# Patient Record
Sex: FEMALE | Race: WHITE | Employment: OTHER | URBAN - METROPOLITAN AREA
[De-identification: names, ages, dates, MRNs, and addresses within clinical notes are randomized per-mention and may not be internally consistent; named-entity substitution may affect disease eponyms.]

---

## 2017-01-05 PROCEDURE — 87086 URINE CULTURE/COLONY COUNT: CPT | Performed by: INTERNAL MEDICINE

## 2017-01-26 RX ORDER — ZOLPIDEM TARTRATE 12.5 MG/1
TABLET, FILM COATED, EXTENDED RELEASE ORAL
Qty: 30 TABLET | Refills: 3 | Status: SHIPPED
Start: 2017-01-26 | End: 2017-05-19

## 2017-02-01 ENCOUNTER — TELEPHONE (OUTPATIENT)
Dept: INTERNAL MEDICINE CLINIC | Facility: CLINIC | Age: 62
End: 2017-02-01

## 2017-02-01 NOTE — TELEPHONE ENCOUNTER
Incoming (mail or fax): Fax  Received from:  Dr. Tobias Roberts  Documentation given to:  Dr. Judit Hernandes consult folder.

## 2017-02-02 ENCOUNTER — MED REC SCAN ONLY (OUTPATIENT)
Dept: INTERNAL MEDICINE CLINIC | Facility: CLINIC | Age: 62
End: 2017-02-02

## 2017-02-10 ENCOUNTER — APPOINTMENT (OUTPATIENT)
Dept: LAB | Age: 62
End: 2017-02-10
Attending: PHYSICIAN ASSISTANT
Payer: MEDICARE

## 2017-02-10 DIAGNOSIS — D48.5 NEOPLASM OF UNCERTAIN BEHAVIOR OF SKIN: ICD-10-CM

## 2017-02-10 PROCEDURE — 88342 IMHCHEM/IMCYTCHM 1ST ANTB: CPT

## 2017-02-20 ENCOUNTER — TELEPHONE (OUTPATIENT)
Dept: INTERNAL MEDICINE CLINIC | Facility: CLINIC | Age: 62
End: 2017-02-20

## 2017-02-20 NOTE — TELEPHONE ENCOUNTER
i can't think of anything else  She cannot have anything stronger than dilaudid for pain, or clonaz and ambien for sleep

## 2017-02-20 NOTE — TELEPHONE ENCOUNTER
See pt faxed letter request.  Celeste Ribeiro to [facility]. Asking for medication for sleeping and pain control on flight. Please advise.

## 2017-02-20 NOTE — TELEPHONE ENCOUNTER
Spoke with pt, advised she already has [place] and clonazepam, she stated she wasn't sure that was going to be enough. Advised she should not take additional sleeping medications.  Noted she has dilaudid for pain but pt stated it \"wires\" her so requesting

## 2017-03-17 ENCOUNTER — TELEPHONE (OUTPATIENT)
Dept: INTERNAL MEDICINE CLINIC | Facility: CLINIC | Age: 62
End: 2017-03-17

## 2017-03-17 ENCOUNTER — OFFICE VISIT (OUTPATIENT)
Dept: INTERNAL MEDICINE CLINIC | Facility: CLINIC | Age: 62
End: 2017-03-17

## 2017-03-17 VITALS
SYSTOLIC BLOOD PRESSURE: 90 MMHG | RESPIRATION RATE: 12 BRPM | HEART RATE: 85 BPM | BODY MASS INDEX: 17.61 KG/M2 | TEMPERATURE: 99 F | HEIGHT: 72 IN | OXYGEN SATURATION: 98 % | DIASTOLIC BLOOD PRESSURE: 50 MMHG | WEIGHT: 130 LBS

## 2017-03-17 DIAGNOSIS — S82.892G CLOSED LEFT ANKLE FRACTURE, WITH DELAYED HEALING, SUBSEQUENT ENCOUNTER: Primary | ICD-10-CM

## 2017-03-17 DIAGNOSIS — Z01.818 PREOP EXAMINATION: ICD-10-CM

## 2017-03-17 DIAGNOSIS — G50.0 TRIGEMINAL NEURALGIA: ICD-10-CM

## 2017-03-17 PROCEDURE — 93000 ELECTROCARDIOGRAM COMPLETE: CPT | Performed by: PHYSICIAN ASSISTANT

## 2017-03-17 PROCEDURE — 99214 OFFICE O/P EST MOD 30 MIN: CPT | Performed by: PHYSICIAN ASSISTANT

## 2017-03-17 NOTE — TELEPHONE ENCOUNTER
Date Preop Scheduled:   Today, 3/17/17  Surgeon's Name:  Dr. Teto Ambrosio   Phone #:  [phone]   Fax #:  Unknown  Surgery Date:  Tuesday, 3/21/17  Procedure/Diagnosis:  Plate being placed in left ankle due to break    Patient states she needs an EKG and visit

## 2017-03-17 NOTE — TELEPHONE ENCOUNTER
Does not appear paperwork was received. Spoke with Jazmin Wiley at [employer] advising would be faxing request and need asap. Fax [phone].

## 2017-03-17 NOTE — PROGRESS NOTES
Alfredo Broderick is a 62year old female who presents for a pre-operative physical exam.   Alfredo Broderick is scheduled for a left ankle open reduction  Internal fixation lateral mallelous procedure at [facility] on 03/21/17 pe 2009     hx: Lorencinda Guerrero, wt loss\"   • Rhabdomyolysis 3/2008   • Bursitis      iliotibial    • Atypical face pain    • Sinus tachycardia    • Cecal volvulus (HCC)    • Thrombocytopenia (HCC)    • Neutrophilic leukocytosis    • H/O respiratory failure    • of TMJ disorder    • Menometrorrhagia    • Neck pain    • Vaginal lump    • Stress fracture      metatarsal, L foot   • History of renal stone    • Pes planus      and hyperpronation   • Myofascial pain    • Bronchitis    • URI (upper respiratory infection pain, early satiety): Normal    OTHER SURGICAL HISTORY  6-28-10    Comment flow US ,dr Montague Height HISTORY  7-8-10    Comment Cysto- Dr. Montague Height HISTORY  8/3/05  [facility])    Comment EGD: Duodenitis, gastritis; duodenal biop (4 mg total) by mouth daily as needed for Pain.  Disp: 30 tablet Rfl: 0   GABAPENTIN 600 MG Oral Tab TAKE 1 & 1/2 TABLETS BY MOUTH 4 TIMES A DAY Disp: 180 tablet Rfl: 3   KLOR-CON M20 20 MEQ Oral Tab CR TAKE 2 TABLETS BY MOUTH EVERY DAY Disp: 60 tablet Rfl: LVH    ASSESSMENT AND PLAN:   Huong Green has no significant history of cardiac or pulmonary conditions. She is a good surgical candidate. This consult was sent back the referring physician, Dr. Lavon Canavan.     Assessment:  Closed left ankle fracture, w

## 2017-03-17 NOTE — TELEPHONE ENCOUNTER
Incoming (mail or fax): Fax  Received from:  [facility] [place] office  Documentation given to:  Triage-incoming pre-op bin.

## 2017-03-18 ENCOUNTER — PATIENT MESSAGE (OUTPATIENT)
Dept: INTERNAL MEDICINE CLINIC | Facility: CLINIC | Age: 62
End: 2017-03-18

## 2017-03-20 NOTE — TELEPHONE ENCOUNTER
Reviewed problem list and meds; don't see any reason to avoid meds; podiatry will give appropriate meds to manage her pain and she can update us when she finds out which medication that is.

## 2017-03-20 NOTE — TELEPHONE ENCOUNTER
Noted below, but no new medication has been prescribed. Does her medical issues give her any cause to avoid certain pain medications? I am not sure why she is asking but will advise her to update us on the medication change when she knows.

## 2017-03-20 NOTE — TELEPHONE ENCOUNTER
From: Margaux Bush  To: Isac Moy MD  Sent: 3/18/2017 8:54 PM CDT  Subject: Prescription Question    I am having ankle surgery on Tues. & I hope it is OK if I accept the pain killer the  offers because my hydroxycodone wires  me & I c

## 2017-04-03 PROCEDURE — 87077 CULTURE AEROBIC IDENTIFY: CPT | Performed by: UROLOGY

## 2017-04-03 PROCEDURE — 87186 SC STD MICRODIL/AGAR DIL: CPT | Performed by: UROLOGY

## 2017-04-03 PROCEDURE — 87086 URINE CULTURE/COLONY COUNT: CPT | Performed by: UROLOGY

## 2017-04-24 RX ORDER — POTASSIUM CHLORIDE 20 MEQ/1
TABLET, EXTENDED RELEASE ORAL
Qty: 60 TABLET | Refills: 0 | Status: SHIPPED | OUTPATIENT
Start: 2017-04-24 | End: 2017-05-19

## 2017-04-24 NOTE — TELEPHONE ENCOUNTER
Needs K+  I see taht her neurologist ordered a CMP? Is that for now or in year?  i would think now as there hasn't been a recent one

## 2017-04-24 NOTE — TELEPHONE ENCOUNTER
Last OV pertinent to medication: 3/17/17 for Pre-Op, 9/2/16 for physical  Last refill date: 8/29/16     #/refills: #60 + 6  When pt was asked to return for OV: NA  Upcoming appt/reason: No future appt      Lab Results  Component Value Date   K 3.8 05/21/20

## 2017-04-26 ENCOUNTER — TELEPHONE (OUTPATIENT)
Dept: INTERNAL MEDICINE CLINIC | Facility: CLINIC | Age: 62
End: 2017-04-26

## 2017-04-26 NOTE — TELEPHONE ENCOUNTER
Received fax from [facility] re: labs done 11/15/16. Included TSH, Magnesium, BMP, Vitamin D---results normal;  and Tissue transglutaminase antibody IGG. To consult folder.

## 2017-04-26 NOTE — TELEPHONE ENCOUNTER
Incoming (mail or fax): Fax  Received from:  [facility] office  Documentation given to:  Triage- in Dr. Nancy Simmons test results.

## 2017-04-27 NOTE — TELEPHONE ENCOUNTER
Labs are normal except vitamin D is a little low at 23. Normal is between . Can you call and ask pt if she is taking supplement right now?  I would like her to start taking 57073 IU weekly x 12 weeks and then recheck vitamin D level so let me know

## 2017-05-01 NOTE — TELEPHONE ENCOUNTER
Its only minimally lower than normal so she can continue with 5000 IU of vitamin D2 if she can't tolerate vitamin D3; summer/sun exposure will also help.  Please update HM

## 2017-05-01 NOTE — TELEPHONE ENCOUNTER
Spoke with pt. Advised as below that vitamin D level is low. Pt states that she is currently taking Vitamin D2  5,000 u daily, sourced from mushrooms. States that she has tried vitamin D3 supplements before and they cause constipation.  States that she hea

## 2017-05-22 RX ORDER — POTASSIUM CHLORIDE 20 MEQ/1
TABLET, EXTENDED RELEASE ORAL
Qty: 60 TABLET | Refills: 1 | Status: SHIPPED | OUTPATIENT
Start: 2017-05-22 | End: 2017-08-29

## 2017-05-22 RX ORDER — ZOLPIDEM TARTRATE 12.5 MG/1
TABLET, FILM COATED, EXTENDED RELEASE ORAL
Qty: 30 TABLET | Refills: 1 | Status: SHIPPED
Start: 2017-05-22 | End: 2017-09-08

## 2017-05-22 NOTE — TELEPHONE ENCOUNTER
Last OV pertinent to medication: 3/17/17 for Pre-Op, 9/2/16 for physical  Last refill date: 4/24/17     #/refills: #60 + 0  Zolpidem 1/26/17 #30 + 3  When pt was asked to return for OV: NA  Upcoming appt/reason: No future appt      Lab Results  Component V

## 2017-05-26 PROBLEM — R39.198 DIFFICULTY URINATING: Status: ACTIVE | Noted: 2017-05-26

## 2017-05-26 PROCEDURE — 87186 SC STD MICRODIL/AGAR DIL: CPT | Performed by: FAMILY MEDICINE

## 2017-05-26 PROCEDURE — 87077 CULTURE AEROBIC IDENTIFY: CPT | Performed by: FAMILY MEDICINE

## 2017-05-26 PROCEDURE — 87086 URINE CULTURE/COLONY COUNT: CPT | Performed by: FAMILY MEDICINE

## 2017-06-23 PROBLEM — R39.11 HESITANCY: Status: ACTIVE | Noted: 2017-06-23

## 2017-06-29 PROCEDURE — 87086 URINE CULTURE/COLONY COUNT: CPT | Performed by: PHYSICIAN ASSISTANT

## 2017-06-29 PROCEDURE — 87077 CULTURE AEROBIC IDENTIFY: CPT | Performed by: PHYSICIAN ASSISTANT

## 2017-06-29 PROCEDURE — 87186 SC STD MICRODIL/AGAR DIL: CPT | Performed by: PHYSICIAN ASSISTANT

## 2017-07-19 ENCOUNTER — APPOINTMENT (OUTPATIENT)
Dept: PHYSICAL THERAPY | Facility: HOSPITAL | Age: 62
End: 2017-07-19
Attending: UROLOGY
Payer: MEDICARE

## 2017-07-20 ENCOUNTER — APPOINTMENT (OUTPATIENT)
Dept: PHYSICAL THERAPY | Facility: HOSPITAL | Age: 62
End: 2017-07-20
Attending: UROLOGY
Payer: MEDICARE

## 2017-07-26 ENCOUNTER — APPOINTMENT (OUTPATIENT)
Dept: PHYSICAL THERAPY | Facility: HOSPITAL | Age: 62
End: 2017-07-26
Attending: UROLOGY
Payer: MEDICARE

## 2017-08-02 ENCOUNTER — APPOINTMENT (OUTPATIENT)
Dept: PHYSICAL THERAPY | Facility: HOSPITAL | Age: 62
End: 2017-08-02
Attending: UROLOGY
Payer: MEDICARE

## 2017-08-09 ENCOUNTER — APPOINTMENT (OUTPATIENT)
Dept: PHYSICAL THERAPY | Facility: HOSPITAL | Age: 62
End: 2017-08-09
Attending: UROLOGY
Payer: MEDICARE

## 2017-08-09 ENCOUNTER — PATIENT MESSAGE (OUTPATIENT)
Dept: INTERNAL MEDICINE CLINIC | Facility: CLINIC | Age: 62
End: 2017-08-09

## 2017-08-10 NOTE — TELEPHONE ENCOUNTER
From: Belén Quinones  To: Vero Hall MD  Sent: 8/9/2017 10:37 PM CDT  Subject: Non-Urgent Medical Question    I would like you to have a copy of my recent visit to [place] . .dates? ?? I think somewhere between 5/1/17 & 6/30/17.  Can you get the

## 2017-08-16 ENCOUNTER — APPOINTMENT (OUTPATIENT)
Dept: PHYSICAL THERAPY | Facility: HOSPITAL | Age: 62
End: 2017-08-16
Attending: UROLOGY
Payer: MEDICARE

## 2017-08-24 ENCOUNTER — PATIENT MESSAGE (OUTPATIENT)
Dept: INTERNAL MEDICINE CLINIC | Facility: CLINIC | Age: 62
End: 2017-08-24

## 2017-08-25 NOTE — TELEPHONE ENCOUNTER
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   This is written in my MRI of 8/21/17 for inner

## 2017-08-30 RX ORDER — POTASSIUM CHLORIDE 20 MEQ/1
TABLET, EXTENDED RELEASE ORAL
Qty: 60 TABLET | Refills: 0 | Status: SHIPPED | OUTPATIENT
Start: 2017-08-30 | End: 2017-10-11

## 2017-08-30 NOTE — TELEPHONE ENCOUNTER
Last OV pertinent to medication: 9/2/16 cpx  Last refill date: 5/22/17     #/refills: 60/1  When pt was asked to return for OV: 1 yr  Upcoming appt/reason: 10/6/17 breast/pelvic    Lab Results  Component Value Date    (H) 05/08/2017   BUN 17 05/08/2

## 2017-09-08 NOTE — TELEPHONE ENCOUNTER
From: Irena Reyna  Sent: 9/8/2017 12:15 PM CDT  Subject: Medication Renewal Request    Martha Go would like a refill of the following medications:  ZOLPIDEM TARTRATE ER 12.5 MG Oral Tab MARY JANE Contreras MD]    [facility]

## 2017-09-11 RX ORDER — ZOLPIDEM TARTRATE 12.5 MG/1
12.5 TABLET, FILM COATED, EXTENDED RELEASE ORAL NIGHTLY PRN
Qty: 30 TABLET | Refills: 1 | Status: SHIPPED
Start: 2017-09-11 | End: 2017-10-11

## 2017-09-11 NOTE — TELEPHONE ENCOUNTER
Last OV pertinent to medication: 3/17/17  Last refill date: 5/22/17     #/refills: 30/1  When pt was asked to return for OV: for breast/pelvic  Upcoming appt/reason: 10/6/17 breast and pelvic

## 2017-09-23 DIAGNOSIS — E87.6 LOW BLOOD POTASSIUM: Primary | ICD-10-CM

## 2017-09-25 RX ORDER — POTASSIUM CHLORIDE 20 MEQ/1
TABLET, EXTENDED RELEASE ORAL
Qty: 60 TABLET | Refills: 0 | Status: SHIPPED | OUTPATIENT
Start: 2017-09-25 | End: 2017-10-11

## 2017-09-25 NOTE — TELEPHONE ENCOUNTER
Last OV pertinent to medication: 9/2/2016 - PE  Last refill date: 8/30/2017     #/refills: 60/0  When pt was asked to return for OV: 1 year  Upcoming appt/reason: 10/6/2017 - Medicare Breast & Pelvic    Lab Results  Component Value Date   K 4.1 05/08/2017

## 2017-09-29 ENCOUNTER — PATIENT MESSAGE (OUTPATIENT)
Dept: INTERNAL MEDICINE CLINIC | Facility: CLINIC | Age: 62
End: 2017-09-29

## 2017-09-30 ENCOUNTER — PATIENT MESSAGE (OUTPATIENT)
Dept: INTERNAL MEDICINE CLINIC | Facility: CLINIC | Age: 62
End: 2017-09-30

## 2017-09-30 DIAGNOSIS — R73.09 ELEVATED GLUCOSE: Primary | ICD-10-CM

## 2017-09-30 NOTE — TELEPHONE ENCOUNTER
From: Jeremy Reina  To: Leidy Zhang MD  Sent: 9/29/2017 9:51 PM CDT  Subject: Non-Urgent Medical Question    Medicare only covers PAP's every 2 years. I had my last one about a year ago. Should I wait about 6 mo. for my next one?   My las

## 2017-10-02 NOTE — TELEPHONE ENCOUNTER
From: Adams Boss  To: Shanice Freed MD  Sent: 9/30/2017 2:24 PM CDT  Subject: Non-Urgent Medical Question    But last year when I had my pap I wasn't on Medicare, so feasibly I could have it in 6 mo. to make it every 1 1/2 years this keaton

## 2017-10-02 NOTE — TELEPHONE ENCOUNTER
From: Ramy Ward  To: Jacqui Higgins MD  Sent: 9/30/2017 2:25 PM CDT  Subject: Test Results Question    I was not fasting. I had just eaten which is why glucose is high . Any comments on abnormal values?

## 2017-10-04 NOTE — TELEPHONE ENCOUNTER
[facility]. Please mail to pt and please advise pt when this is done.  Thank you

## 2017-10-10 ENCOUNTER — TELEPHONE (OUTPATIENT)
Dept: INTERNAL MEDICINE CLINIC | Facility: CLINIC | Age: 62
End: 2017-10-10

## 2017-10-10 NOTE — TELEPHONE ENCOUNTER
Patient called, she has had the low grade fever since Sunday, patient said her normal temperature is 97.3, and it is ranging from about 98.1-98.9, patient is tired, fatigue and dizzy at times.  Patient wanted to antibiotic over the phone, advised to patient

## 2017-10-10 NOTE — TELEPHONE ENCOUNTER
Spoke to pt, advised that a fever and fatigue needs to be seen in the office. Denies acute distress at the moment. OV made with Shelley Swift for 10/11/17.

## 2017-10-11 ENCOUNTER — OFFICE VISIT (OUTPATIENT)
Dept: INTERNAL MEDICINE CLINIC | Facility: CLINIC | Age: 62
End: 2017-10-11

## 2017-10-11 VITALS
WEIGHT: 122 LBS | BODY MASS INDEX: 16.52 KG/M2 | RESPIRATION RATE: 16 BRPM | TEMPERATURE: 98 F | HEIGHT: 72 IN | OXYGEN SATURATION: 98 % | SYSTOLIC BLOOD PRESSURE: 104 MMHG | HEART RATE: 61 BPM | DIASTOLIC BLOOD PRESSURE: 58 MMHG

## 2017-10-11 DIAGNOSIS — J06.9 UPPER RESPIRATORY TRACT INFECTION, UNSPECIFIED TYPE: Primary | ICD-10-CM

## 2017-10-11 PROCEDURE — 99213 OFFICE O/P EST LOW 20 MIN: CPT | Performed by: NURSE PRACTITIONER

## 2017-10-11 RX ORDER — AZITHROMYCIN 250 MG/1
TABLET, FILM COATED ORAL
Qty: 6 TABLET | Refills: 0 | Status: SHIPPED | OUTPATIENT
Start: 2017-10-11 | End: 2017-11-20 | Stop reason: ALTCHOICE

## 2017-10-11 NOTE — PROGRESS NOTES
Patient presents with:  Headache  Hot Flashes  Fatigue  Dizziness      HPI:  Presents with approx 4 day history of fatigue, occasional dizziness, \"hot flashes\", chills, head pressure and headaches.  Also stated she has had \"low grade fevers\" as her temp respiratory failure    • H/O sinus tachycardia    • Hematuria    • High cholesterol    • History of bone density study 8/4/2011   • History of depression    • History of insomnia    • History of oral surgery     7 root canals   • History of renal stone neuralgia    • Unspecified hypothyroidism    • URI (upper respiratory infection)    • URI (upper respiratory infection)    • Vaginal lump    • Vegetarian    • Viral syndrome    • Vitamin D deficiency 7/9/2015    On IM vitamin D q 6 mos w/endocrine   • Volv FLUTICASONE PROPIONATE 50 MCG/ACT Nasal Suspension USE 2 SPRAYS IN EACH NOSTRIL DAILY Disp: 1 Bottle Rfl: 11   ValACYclovir HCl (VALTREX) 1 G Oral Tab Take 2 tabs po at onset repeat 12 hours later Disp: 28 tablet Rfl: 0   Saline 0.2 % Nasal Solution by [facility] & Consults:  None    No Follow-up on file. Patient Instructions                     Gargle with warm salt water solution 3-5 times daily. Dissolve 1/2 teaspoon salt in half cup of warm tap water. Gargle and spit.      Use a humidifier in yo

## 2017-10-11 NOTE — PATIENT INSTRUCTIONS
Gargle with warm salt water solution 3-5 times daily. Dissolve 1/2 teaspoon salt in half cup of warm tap water. Gargle and spit. Use a humidifier in your room at night.      I highly recommend using a saline nasal wash device such as:

## 2017-10-31 RX ORDER — POTASSIUM CHLORIDE 20 MEQ/1
TABLET, EXTENDED RELEASE ORAL
Qty: 60 TABLET | Refills: 0 | OUTPATIENT
Start: 2017-10-31

## 2017-11-02 ENCOUNTER — PATIENT MESSAGE (OUTPATIENT)
Dept: INTERNAL MEDICINE CLINIC | Facility: CLINIC | Age: 62
End: 2017-11-02

## 2017-11-03 NOTE — TELEPHONE ENCOUNTER
From: Vanessa Samuel  To: Collette Swain MD  Sent: 11/2/2017 9:19 PM CDT  Subject: Test Results Question    I am going Friday

## 2017-11-08 NOTE — TELEPHONE ENCOUNTER
Spoke with pt. States that she has been taking Potassium Chloride ER 20 mEq, 1 BID for over 2 years. States that she has chronic diarrhea and needs this supplement. Advised pt that this med does not appear on her current med list and appears to have been discontinued   Pt states that she never stopped taking this med and was taking this med at the time of her last Ov on 10/11/17. Pt asking for a refill but still has #15 pills left. Please advise. OK to refill? Reorder?

## 2017-11-09 RX ORDER — POTASSIUM CHLORIDE 20 MEQ/1
TABLET, EXTENDED RELEASE ORAL
Qty: 60 TABLET | Refills: 2 | Status: SHIPPED | OUTPATIENT
Start: 2017-11-09 | End: 2018-01-25

## 2017-11-16 ENCOUNTER — PATIENT MESSAGE (OUTPATIENT)
Dept: INTERNAL MEDICINE CLINIC | Facility: CLINIC | Age: 62
End: 2017-11-16

## 2017-11-17 NOTE — TELEPHONE ENCOUNTER
See email, pt needs a PT referral because the PT order for TMJ isn't covered? Will you write? Does she need OV?

## 2017-11-17 NOTE — TELEPHONE ENCOUNTER
Who is writing PT orders for the neck issues? For the cervicalgia, trigeminal neuropathy, upper back pain now?

## 2017-11-17 NOTE — TELEPHONE ENCOUNTER
From: Belén Quinones  To: Vero Hall MD  Sent: 11/16/2017 3:42 PM CST  Subject: Non-Urgent Medical Question    Dr Payal Odonnell ordered PT for my moderate-severe TMJ osteoarthritis found on brain scan.  Now we find out that Medicare doesn't cover

## 2017-11-17 NOTE — TELEPHONE ENCOUNTER
Who said she needed PT for \"cervicalgia, C2 rotation, upper back pain, trigeminal neuralgia and poor posture? ?   I would think her neuro would have evaluated all of those things and could recommend PT

## 2017-12-04 ENCOUNTER — TELEPHONE (OUTPATIENT)
Dept: INTERNAL MEDICINE CLINIC | Facility: CLINIC | Age: 62
End: 2017-12-04

## 2017-12-04 NOTE — TELEPHONE ENCOUNTER
Preop Appt. Date:  12/15/17  Surgeon's Name:  Dr. Tamara Tilley       Phone #:  [phone]       Fax #:  [phone]  Surgery being performed/diagnosis:  Removal of metal plate and screws from left ankle  Surgery Date:  Either 12/21/17 or 12/28/17.   Pt will ca

## 2017-12-07 ENCOUNTER — TELEPHONE (OUTPATIENT)
Dept: INTERNAL MEDICINE CLINIC | Facility: CLINIC | Age: 62
End: 2017-12-07

## 2017-12-07 NOTE — TELEPHONE ENCOUNTER
Incoming (mail or fax): fax  Received from:  [address] [facility] [address]  Documentation given to:  triage;

## 2017-12-07 NOTE — TELEPHONE ENCOUNTER
Received fax from [address] [place] [address] ( Dr. Tracey Lock. Tristan) re: notification that pt has surgery 12/21/17 and requesting that a history and physical be completed and faxed to Fax # ([phone]).     Pt has Pre-op appt with Dr Aminah Murdock scheduled 12/15/17

## 2017-12-07 NOTE — TELEPHONE ENCOUNTER
Received fax from [address] [place] [address] per Dr. Nav Scales. Tristan re: letter stating that completed history and physical need to be faxed to Fax # [phone]. See encounter 12/7/17.     To Triage pre-op pending bin

## 2017-12-11 NOTE — TELEPHONE ENCOUNTER
Received completed pre-op requirements form from Dr Jeronimo Castellanos office. Pt will be sent for pre-op testing by surgeon or preadmission (EKG). Pt has pre-op appt with Dr Belinda Vaca on 12/15/17. Routed to Dr Belinda Vaca for review.

## 2017-12-15 ENCOUNTER — OFFICE VISIT (OUTPATIENT)
Dept: INTERNAL MEDICINE CLINIC | Facility: CLINIC | Age: 62
End: 2017-12-15

## 2017-12-15 VITALS
SYSTOLIC BLOOD PRESSURE: 104 MMHG | TEMPERATURE: 99 F | HEART RATE: 63 BPM | BODY MASS INDEX: 18.48 KG/M2 | RESPIRATION RATE: 16 BRPM | OXYGEN SATURATION: 98 % | HEIGHT: 70.75 IN | WEIGHT: 132 LBS | DIASTOLIC BLOOD PRESSURE: 62 MMHG

## 2017-12-15 DIAGNOSIS — K59.9 COLONIC DYSMOTILITY: ICD-10-CM

## 2017-12-15 DIAGNOSIS — E03.9 ACQUIRED HYPOTHYROIDISM: ICD-10-CM

## 2017-12-15 DIAGNOSIS — G50.0 TRIGEMINAL NEURALGIA: ICD-10-CM

## 2017-12-15 DIAGNOSIS — M79.7 FIBROMYALGIA: ICD-10-CM

## 2017-12-15 DIAGNOSIS — F51.01 PRIMARY INSOMNIA: ICD-10-CM

## 2017-12-15 DIAGNOSIS — Z01.818 PREOPERATIVE CLEARANCE: Primary | ICD-10-CM

## 2017-12-15 DIAGNOSIS — T84.84XA PAINFUL ORTHOPAEDIC HARDWARE (HCC): ICD-10-CM

## 2017-12-15 DIAGNOSIS — F17.200 TOBACCO DEPENDENCE: ICD-10-CM

## 2017-12-15 PROBLEM — R39.198 DIFFICULTY URINATING: Status: RESOLVED | Noted: 2017-05-26 | Resolved: 2017-12-15

## 2017-12-15 PROBLEM — R39.11 HESITANCY: Status: RESOLVED | Noted: 2017-06-23 | Resolved: 2017-12-15

## 2017-12-15 PROCEDURE — 99214 OFFICE O/P EST MOD 30 MIN: CPT | Performed by: INTERNAL MEDICINE

## 2017-12-15 RX ORDER — ZOLPIDEM TARTRATE 12.5 MG/1
1 TABLET, FILM COATED, EXTENDED RELEASE ORAL NIGHTLY
COMMUNITY
Start: 2017-11-27 | End: 2017-12-19

## 2017-12-15 NOTE — PROGRESS NOTES
Louis Yuen is a 65year old female who presents for a pre-operative physical exam. Patient is to have removal of hardware left ankle, to be done by Dr. America Mi  at [facility]  on 12/21/17.       HPI:   She is under treatment for chroni Nasal Solution by Nasal route. Disp:  Rfl:    Glucosamine-Chondroit-Vit C-Mn (GLUCOSAMINE CHONDR 1500 COMPLX) Oral Cap Take 1 tablet by mouth daily.  Disp:  Rfl:    Levothyroxine Sodium 75 MCG Oral Tab 1 TABLET DAILY Disp:  Rfl:    DAILY MULTIVITAMIN OR 1 p of bone density study 8/4/2011   • History of depression    • History of insomnia    • History of oral surgery     7 root canals   • History of renal stone    • History of TMJ disorder    • History of UTI    • HPV in female 1990s, then 2014, 16    ASCUS 21 respiratory infection)    • Vaginal lump    • Vegetarian    • Viral syndrome    • Vitamin D deficiency 7/9/2015    On IM vitamin D q 6 mos w/endocrine   • Volvulus (HCC)       Past Surgical History:  No date: BREAST SURGERY PROCEDURE UNLISTED      Comment: SURGERY      Comment: BCC-nodular to left upper lip/MOHS by AB  No date: SPECIAL SERVICE OR REPORT      Comment: Partial thyroidectomy  No date: SPECIAL SERVICE OR REPORT      Comment: Breast augmentation  No date: SPECIAL SERVICE OR REPORT      Comment: S stream  MUSCULOSKELETAL: denies back pain  NEURO: denies headaches  PSYCHE: denies depression or anxiety  HEMATOLOGIC: denies hx of anemia  ENDOCRINE: denies thyroid history  ALL/ASTHMA: denies hx of allergy or asthma      PRE-OP ROS:   Denies hx of VTE, b consult was sent back the referring physician, Danay Nunez.  She underwent ORIF same ankle 3/17 without perioperative complications    Preoperative clearance  (primary encounter diagnosis)  Painful orthopaedic hardware (hcc)  Fibromyalgia- I have informed p

## 2017-12-16 ENCOUNTER — TELEPHONE (OUTPATIENT)
Dept: INTERNAL MEDICINE CLINIC | Facility: CLINIC | Age: 62
End: 2017-12-16

## 2017-12-18 RX ORDER — OXYCODONE HYDROCHLORIDE 5 MG/1
TABLET ORAL
Qty: 12 TABLET | Refills: 0 | COMMUNITY
Start: 2017-12-18 | End: 2018-10-11

## 2017-12-18 NOTE — TELEPHONE ENCOUNTER
Spoke with Ulises Bro, pharmacist at [facility]. States that prescription is ready for pt to  and they will contact pt.

## 2017-12-18 NOTE — TELEPHONE ENCOUNTER
Patient is requesting rx for #30. The copay is the same for $30. She takes 2-3 per week and has surgery on Wednesday so will need more then 12. Please advise patient if we can do this.

## 2017-12-18 NOTE — TELEPHONE ENCOUNTER
Received call from Jeremiah Gibson, pharmacist at [facility]. Requesting clarification of Oxycodone order. Verified dose as below, quantity #12. Prescription pended.

## 2017-12-20 NOTE — TELEPHONE ENCOUNTER
Last OV relevant to medication: 12/15/17  Last refill date: 9/11/17     #/refills: 30/1  When pt was asked to return for OV: none noted  Upcoming appt/reason: none

## 2017-12-21 ENCOUNTER — PATIENT MESSAGE (OUTPATIENT)
Dept: INTERNAL MEDICINE CLINIC | Facility: CLINIC | Age: 62
End: 2017-12-21

## 2017-12-21 RX ORDER — ZOLPIDEM TARTRATE 12.5 MG/1
TABLET, FILM COATED, EXTENDED RELEASE ORAL
Qty: 30 TABLET | Refills: 0 | Status: SHIPPED
Start: 2017-12-21 | End: 2018-03-14

## 2017-12-22 NOTE — TELEPHONE ENCOUNTER
From: Vanesa Kate  To: Ariel Vu MD  Sent: 12/21/2017 9:11 PM CST  Subject: Prescription Question    How about acetaminophen/codeine tabs. They are only $2/mo. Is this something you would prescribe for me?

## 2018-01-19 ENCOUNTER — TELEPHONE (OUTPATIENT)
Dept: INTERNAL MEDICINE CLINIC | Facility: CLINIC | Age: 63
End: 2018-01-19

## 2018-01-19 NOTE — TELEPHONE ENCOUNTER
Milly requests Subscriber Response Form be completed and faxed back to [facility] at fax# [phone].

## 2018-01-24 ENCOUNTER — TELEPHONE (OUTPATIENT)
Dept: INTERNAL MEDICINE CLINIC | Facility: CLINIC | Age: 63
End: 2018-01-24

## 2018-01-25 RX ORDER — POTASSIUM CHLORIDE 1500 MG/1
TABLET, FILM COATED, EXTENDED RELEASE ORAL
Qty: 60 TABLET | Refills: 0 | OUTPATIENT
Start: 2018-01-25

## 2018-01-25 RX ORDER — POTASSIUM CHLORIDE 20 MEQ/1
40 TABLET, EXTENDED RELEASE ORAL
Qty: 60 TABLET | Refills: 2 | Status: SHIPPED | OUTPATIENT
Start: 2018-01-25 | End: 2018-04-10

## 2018-01-25 NOTE — TELEPHONE ENCOUNTER
Last OV relevant to medication: 1215/17  Last refill date: 11/9/17     #/refills: 60/2  When pt was asked to return for OV: none  Upcoming appt/reason: none    Lab Results  Component Value Date   GLU 95 11/03/2017   BUN 16 09/29/2017   CREATSERUM 1.15 (H)

## 2018-01-25 NOTE — TELEPHONE ENCOUNTER
Left detailed message on pt's voice mail re: need condition update. Pt asked to call back regarding her pain.

## 2018-01-30 NOTE — TELEPHONE ENCOUNTER
Left msg for pt to call back to see if she still needs request or if this has been handled.  On chart review, notes sent to neuro, pt already has appt with pain clinic so likely already has referral.

## 2018-01-30 NOTE — TELEPHONE ENCOUNTER
Pt called back, has been treated for pain with opiods in past but no longer receiving from this office, had been recommended by us to go to pain clinic. Pt will be seeing Dr Stefania Santos at [facility] [place] to place referral? # visits? Diagnosis?  (fibro?)

## 2018-01-31 ENCOUNTER — TELEPHONE (OUTPATIENT)
Dept: INTERNAL MEDICINE CLINIC | Facility: CLINIC | Age: 63
End: 2018-01-31

## 2018-01-31 NOTE — TELEPHONE ENCOUNTER
Pt states that [facility] does not have approval for Potassium Chloride ER 20 MEQ Oral Tab CR, please call pt to let her know the status. Thank you.

## 2018-02-01 ENCOUNTER — TELEPHONE (OUTPATIENT)
Dept: INTERNAL MEDICINE CLINIC | Facility: CLINIC | Age: 63
End: 2018-02-01

## 2018-02-01 DIAGNOSIS — M25.572 LEFT ANKLE PAIN, UNSPECIFIED CHRONICITY: Primary | ICD-10-CM

## 2018-02-01 NOTE — TELEPHONE ENCOUNTER
Patient needs order for pain clinc.  We sent records but they need an order.   Dr. Kareen Israel @ [facility]  Fax #[phone]

## 2018-02-01 NOTE — TELEPHONE ENCOUNTER
Noted below that pt requesting order for pain clinic with Dr. Kota Vera at [facility]. Please advise. OK to order? Referral order pended.

## 2018-02-07 NOTE — TELEPHONE ENCOUNTER
LM for pt to call. Asked her to let us know the reason she is having pain so we can note it on her referral. Dr. Dionisio Mesa approved the referral, but needs to know why she needs to be referred to the Pain Clinic. Pt has history of below per 12/13/17 OV note.

## 2018-02-07 NOTE — TELEPHONE ENCOUNTER
Pt to return on 2/16/18 for Power port access. Copies of medication list and upcoming appointments given prior to discharge.    See email, pt was not fasting.

## 2018-03-13 ENCOUNTER — PATIENT MESSAGE (OUTPATIENT)
Dept: INTERNAL MEDICINE CLINIC | Facility: CLINIC | Age: 63
End: 2018-03-13

## 2018-03-14 RX ORDER — ZOLPIDEM TARTRATE 12.5 MG/1
TABLET, FILM COATED, EXTENDED RELEASE ORAL
Qty: 30 TABLET | Refills: 0 | Status: SHIPPED | OUTPATIENT
Start: 2018-03-14 | End: 2018-04-10

## 2018-03-14 NOTE — TELEPHONE ENCOUNTER
Medication(s) to Refill:   Pending Prescriptions Disp Refills    Zolpidem Tartrate ER 12.5 MG Oral Tab CR 30 tablet 0     Sig: TAKE 1 TABLET BY MOUTH NIGHTLY AS NEEDED FOR SLEEP             Reason for Medication Refill being sent to Provider / Reason Jace

## 2018-03-14 NOTE — TELEPHONE ENCOUNTER
From: Dariel Moura  To: Shanique Rosas MD  Sent: 3/13/2018 10:34 PM CDT  Subject: Prescription Question    I tried without Zolpidem & just cannot sleep, & my bladder is the same on & off it. So, I am back on it & need a refill.  I now use [facility]

## 2018-04-10 ENCOUNTER — OFFICE VISIT (OUTPATIENT)
Dept: INTERNAL MEDICINE CLINIC | Facility: CLINIC | Age: 63
End: 2018-04-10

## 2018-04-10 VITALS
SYSTOLIC BLOOD PRESSURE: 98 MMHG | HEART RATE: 74 BPM | HEIGHT: 72 IN | OXYGEN SATURATION: 98 % | DIASTOLIC BLOOD PRESSURE: 56 MMHG | RESPIRATION RATE: 14 BRPM | TEMPERATURE: 98 F | WEIGHT: 135.63 LBS | BODY MASS INDEX: 18.37 KG/M2

## 2018-04-10 DIAGNOSIS — Z12.31 ENCOUNTER FOR SCREENING MAMMOGRAM FOR BREAST CANCER: Primary | ICD-10-CM

## 2018-04-10 DIAGNOSIS — B97.7 HPV IN FEMALE: ICD-10-CM

## 2018-04-10 DIAGNOSIS — Z12.4 ENCOUNTER FOR SCREENING FOR CERVICAL CANCER: ICD-10-CM

## 2018-04-10 DIAGNOSIS — Z01.419 ENCOUNTER FOR GYNECOLOGICAL EXAMINATION WITHOUT ABNORMAL FINDING: ICD-10-CM

## 2018-04-10 PROCEDURE — 87624 HPV HI-RISK TYP POOLED RSLT: CPT | Performed by: INTERNAL MEDICINE

## 2018-04-10 PROCEDURE — 88175 CYTOPATH C/V AUTO FLUID REDO: CPT | Performed by: INTERNAL MEDICINE

## 2018-04-10 PROCEDURE — G0101 CA SCREEN;PELVIC/BREAST EXAM: HCPCS | Performed by: INTERNAL MEDICINE

## 2018-04-10 RX ORDER — ALFUZOSIN HYDROCHLORIDE 10 MG/1
1 TABLET, EXTENDED RELEASE ORAL NIGHTLY
COMMUNITY
Start: 2018-01-24 | End: 2018-08-24

## 2018-04-10 RX ORDER — POTASSIUM CHLORIDE 20 MEQ/1
40 TABLET, EXTENDED RELEASE ORAL
Qty: 180 TABLET | Refills: 1 | Status: SHIPPED | OUTPATIENT
Start: 2018-04-10 | End: 2018-08-16 | Stop reason: RX

## 2018-04-10 RX ORDER — ZOLPIDEM TARTRATE 12.5 MG/1
TABLET, FILM COATED, EXTENDED RELEASE ORAL
Qty: 90 TABLET | Refills: 1 | Status: SHIPPED | OUTPATIENT
Start: 2018-04-10 | End: 2018-10-06

## 2018-04-10 NOTE — PROGRESS NOTES
HPI:   Griselda Hailstone is a 65year old female who presents for a medicare breast and pelvic exam. .  Her hardware [id number] from her ankle was a success  Needs refill on her ambien and K+    Wt Readings from Last 6 Encounters:  04/10/18 : 135 lb 9.6 oz  0 Nasal route. Disp:  Rfl:    Glucosamine-Chondroit-Vit C-Mn (GLUCOSAMINE CHONDR 1500 COMPLX) Oral Cap Take 1 tablet by mouth daily.  Disp:  Rfl:    Levothyroxine Sodium 75 MCG Oral Tab 1 TABLET DAILY Disp:  Rfl:    DAILY MULTIVITAMIN OR 1 po qd Disp:  Rfl: or tenderness  Adnexa/Parametria: no masses or tenderness  Rectovaginal: no visible hemorrhoids, sphincter tone normal,no rectal masses or nodularity, HO- stool      ASSESSMENT AND PLAN:   Nubia Esteban is a 65year old female who presents for amedicar

## 2018-04-12 PROBLEM — I83.90 VARICOSE VEIN OF LEG: Status: ACTIVE | Noted: 2018-04-12

## 2018-04-18 ENCOUNTER — TELEPHONE (OUTPATIENT)
Dept: INTERNAL MEDICINE CLINIC | Facility: CLINIC | Age: 63
End: 2018-04-18

## 2018-04-24 NOTE — PROGRESS NOTES
HPI:   Rossana Juárez is a 65year old female who presents for a Medicare Initial Annual Wellness visit (Once after 12 month Medicare anniversary) .     I treat her for chronic insomnia and chronic pain    She has new \"boomer headaches\" tried excedri (over the last two weeks)?: Not at all  PHQ-2 SCORE: 0     Advanced Directive:   She does NOT have a Living Will on file in [facility] [address]    Advance care planning including the explanation and discussion of advance directives standard forms performed Face to Face w .0 09/09/2016        ALLERGIES:   She is allergic to carbamazepine; aspirin; depakote [valproic acid]; ibuprofen; iron; lactated ringers; tegretol; ultram [tramadol hcl]; and vicodin [hydrocodone-acetaminophen].     CURRENT MEDICATIONS:     [address] EKG; Abnormal head CT; Acute renal failure ([facility] [address] Anemia; Anorexia (2009); Arthritis; Atelectasis; Atypical face pain; B12 deficiency; Basal cell carcinoma of lip (5/11/2015); Bloating; Bowel obstruction ([facility] [address]  Bronchitis; Bursitis; Cardiomyopathy ([facility] [address] (2 Sepsis(995.91) (2008); [place] ([facility] [address] [facility] (2011); Sinus tachycardia; Sleep apnea; Sleep apnea, obstructive (PSG 11-5-10 Oral TX 6-6-12); Slurring of speech; Stress fracture; Tachycardia; Thrombocytopenia ([facility] [facility] Thyroid nodule; Trigeminal neuralgia;  Unspecified hy HPI    EXAM:   BP 92/54 (BP Location: Left arm, Patient Position: Sitting, Cuff Size: adult)   Pulse 76   Temp 98.6 °F (37 °C) (Oral)   Resp 14   Ht 71\"   Wt 137 lb   LMP 12/01/2012   SpO2 98%   BMI 19.11 kg/m²  Estimated body mass index is 19.11 kg/m² as This section provided for quick review of chart, separate sheet to patient  [address] Internal Lab or Procedure External Lab or Procedure   Diabetes Screening      HbgA1C   Annually No results found for: A1C No flowshe (Prevnar)  Covered Once after 65 No vaccine history found Please get once after your 65th birthday    Pneumococcal 23 (Pneumovax)  Covered Once after 65 No vaccine history found Please get once after your 65th birthday    Hepatitis B for Moderate/High Risk

## 2018-04-24 NOTE — PATIENT INSTRUCTIONS
Sharon Dumont's SCREENING SCHEDULE   Tests on this list are recommended by your physician but may not be covered, or covered at this frequency, by your insurer. Please check with your insurance carrier before scheduling to verify coverage.    Tania Hwang Sigmoidoscopy Screen  Covered every 5 years No results found for this or any previous visit. No flowsheet data found. Fecal Occult Blood   Covered Annually No results found for: FOB, OCCULTSTOOL No flowsheet data found.      Barium Enema-   uncomfortabl get once after your 65th birthday    Hepatitis B for Moderate/High Risk       No orders found for this or any previous visit.      NA Medium/high risk factors:   End-stage renal disease   Hemophiliacs who received Factor VIII or IX concentrates   Clients of

## 2018-04-25 RX ORDER — POTASSIUM CHLORIDE 1500 MG/1
TABLET, FILM COATED, EXTENDED RELEASE ORAL
Qty: 60 TABLET | Refills: 2 | OUTPATIENT
Start: 2018-04-25

## 2018-04-26 ENCOUNTER — APPOINTMENT (OUTPATIENT)
Dept: LAB | Age: 63
End: 2018-04-26
Attending: INTERNAL MEDICINE
Payer: MEDICARE

## 2018-04-26 DIAGNOSIS — Z11.59 NEED FOR HEPATITIS C SCREENING TEST: ICD-10-CM

## 2018-04-26 PROCEDURE — 80048 BASIC METABOLIC PNL TOTAL CA: CPT | Performed by: INTERNAL MEDICINE

## 2018-04-26 PROCEDURE — 36415 COLL VENOUS BLD VENIPUNCTURE: CPT

## 2018-04-26 PROCEDURE — 80061 LIPID PANEL: CPT | Performed by: INTERNAL MEDICINE

## 2018-04-26 PROCEDURE — 86803 HEPATITIS C AB TEST: CPT

## 2018-05-08 PROCEDURE — 87186 SC STD MICRODIL/AGAR DIL: CPT | Performed by: UROLOGY

## 2018-05-08 PROCEDURE — 81015 MICROSCOPIC EXAM OF URINE: CPT | Performed by: UROLOGY

## 2018-05-08 PROCEDURE — 87086 URINE CULTURE/COLONY COUNT: CPT | Performed by: UROLOGY

## 2018-05-08 PROCEDURE — 87077 CULTURE AEROBIC IDENTIFY: CPT | Performed by: UROLOGY

## 2018-06-19 ENCOUNTER — TELEPHONE (OUTPATIENT)
Dept: INTERNAL MEDICINE CLINIC | Facility: CLINIC | Age: 63
End: 2018-06-19

## 2018-06-19 NOTE — TELEPHONE ENCOUNTER
Patient called, she said that last Wednesday or Thursday she believe she got bit by a tick.  Patient does not have the tick inside of her skin as of now, patient said this really hurt when this happened, it was on the back of her leg, she is unsure if there

## 2018-06-19 NOTE — TELEPHONE ENCOUNTER
Spoke to the patient who states that last Wednesday (06/13/18) she was sitting outside and got bit by an insect that really burned and hurt the back of her leg.  The patient states that there was intense pain for about a half an hour that she could not reli

## 2018-07-05 ENCOUNTER — TELEPHONE (OUTPATIENT)
Dept: INTERNAL MEDICINE CLINIC | Facility: CLINIC | Age: 63
End: 2018-07-05

## 2018-07-05 NOTE — TELEPHONE ENCOUNTER
Patient called in complaining of extreme dizziness, visual changes, room spinning, general weakness, light headedness. Patient states that her partner had to help her to the bathroom this morning because she had trouble walking.  Patient states she has had

## 2018-08-15 ENCOUNTER — TELEPHONE (OUTPATIENT)
Dept: INTERNAL MEDICINE CLINIC | Facility: CLINIC | Age: 63
End: 2018-08-15

## 2018-08-15 NOTE — TELEPHONE ENCOUNTER
Incoming (mail or fax):   Fax  Received from: [facility]   Documentation given to: Triage

## 2018-08-15 NOTE — TELEPHONE ENCOUNTER
Incoming (mail or fax):  fax  Received from:  [facility]  Documentation given to:  Dr Lyly Angeles for Dr Taylor Soriano

## 2018-08-16 RX ORDER — POTASSIUM CHLORIDE 20 MEQ/1
TABLET, EXTENDED RELEASE ORAL
Qty: 180 TABLET | Refills: 0 | Status: SHIPPED | OUTPATIENT
Start: 2018-08-16 | End: 2019-04-26 | Stop reason: ALTCHOICE

## 2018-08-16 NOTE — TELEPHONE ENCOUNTER
Fax from [address] indicating that the patient's potassium is on back order. They need us to send in a new script for Klor-Con M20, as the only  of the Potassium Chloride ER is not manufacturing it anymore.  Another drug company will be, but

## 2018-08-17 ENCOUNTER — TELEPHONE (OUTPATIENT)
Dept: INTERNAL MEDICINE CLINIC | Facility: CLINIC | Age: 63
End: 2018-08-17

## 2018-08-17 NOTE — TELEPHONE ENCOUNTER
Klor-Con electronically sent yesterday, 08/16/18. Called the pharmacist at [facility] and gave a verbal for the KLOR-CON 20 mEq two (2) tablets po qd #180 with 0 refills. They will get it ready for the patient. Patient was notified and verbalized understanding.

## 2018-08-17 NOTE — TELEPHONE ENCOUNTER
Pt needs new version of [facility] on [address] [facility] rec'd order.  Thank you

## 2018-08-31 ENCOUNTER — TELEPHONE (OUTPATIENT)
Dept: INTERNAL MEDICINE CLINIC | Facility: CLINIC | Age: 63
End: 2018-08-31

## 2018-08-31 NOTE — TELEPHONE ENCOUNTER
Called the patient informed that Dr. Ivy Rader received the copies of her CTA and MRI and agrees with the neurologist's recommendations. The patient verbalized understanding.

## 2018-08-31 NOTE — TELEPHONE ENCOUNTER
Pt dropped off copies of CTA and MRI brain and a note  I would defer to what her neurologist recommended for her- sounds like the standard of care that I would recommend

## 2018-09-05 ENCOUNTER — TELEPHONE (OUTPATIENT)
Dept: INTERNAL MEDICINE CLINIC | Facility: CLINIC | Age: 63
End: 2018-09-05

## 2018-10-08 RX ORDER — ZOLPIDEM TARTRATE 12.5 MG/1
TABLET, FILM COATED, EXTENDED RELEASE ORAL
Qty: 90 TABLET | Refills: 1 | Status: SHIPPED
Start: 2018-10-08 | End: 2019-02-14

## 2018-10-08 NOTE — TELEPHONE ENCOUNTER
Last OV relevant to medication:4/24/18  Last refill date: 4/10/18    #90/refills: 1  When pt was asked to return for OV: No return date given  Upcoming appt/reason: No appt scheduled.

## 2018-10-17 PROCEDURE — 87086 URINE CULTURE/COLONY COUNT: CPT | Performed by: UROLOGY

## 2018-10-17 PROCEDURE — 87186 SC STD MICRODIL/AGAR DIL: CPT | Performed by: UROLOGY

## 2018-10-17 PROCEDURE — 87077 CULTURE AEROBIC IDENTIFY: CPT | Performed by: UROLOGY

## 2018-10-17 PROCEDURE — 81001 URINALYSIS AUTO W/SCOPE: CPT | Performed by: UROLOGY

## 2018-11-06 ENCOUNTER — HOSPITAL ENCOUNTER (OUTPATIENT)
Dept: GENERAL RADIOLOGY | Facility: HOSPITAL | Age: 63
Discharge: HOME | End: 2018-11-06
Attending: INTERNAL MEDICINE
Payer: MEDICARE

## 2018-11-06 PROCEDURE — 74018 RADEX ABDOMEN 1 VIEW: CPT | Performed by: INTERNAL MEDICINE

## 2018-11-08 ENCOUNTER — HOSPITAL ENCOUNTER (OUTPATIENT)
Dept: GENERAL RADIOLOGY | Facility: HOSPITAL | Age: 63
Discharge: HOME | End: 2018-11-08
Attending: INTERNAL MEDICINE
Payer: MEDICARE

## 2018-11-08 DIAGNOSIS — K59.00 CONSTIPATION, UNSPECIFIED CONSTIPATION TYPE: ICD-10-CM

## 2018-11-08 PROCEDURE — 74018 RADEX ABDOMEN 1 VIEW: CPT | Performed by: INTERNAL MEDICINE

## 2018-11-09 ENCOUNTER — HOSPITAL ENCOUNTER (OUTPATIENT)
Dept: GENERAL RADIOLOGY | Facility: HOSPITAL | Age: 63
Discharge: HOME | End: 2018-11-09
Attending: INTERNAL MEDICINE
Payer: MEDICARE

## 2018-11-09 DIAGNOSIS — K59.00 CONSTIPATION, UNSPECIFIED CONSTIPATION TYPE: ICD-10-CM

## 2018-11-09 PROCEDURE — 74270 X-RAY XM COLON 1CNTRST STD: CPT | Performed by: INTERNAL MEDICINE

## 2018-11-30 PROCEDURE — 87088 URINE BACTERIA CULTURE: CPT | Performed by: UROLOGY

## 2018-11-30 PROCEDURE — 87086 URINE CULTURE/COLONY COUNT: CPT | Performed by: UROLOGY

## 2018-11-30 PROCEDURE — 81001 URINALYSIS AUTO W/SCOPE: CPT | Performed by: UROLOGY

## 2018-11-30 PROCEDURE — 87186 SC STD MICRODIL/AGAR DIL: CPT | Performed by: UROLOGY

## 2018-12-03 RX ORDER — POTASSIUM CHLORIDE 20 MEQ/1
40 TABLET, EXTENDED RELEASE ORAL DAILY
Qty: 180 TABLET | Refills: 1 | Status: SHIPPED | OUTPATIENT
Start: 2018-12-03 | End: 2019-10-25

## 2018-12-03 NOTE — TELEPHONE ENCOUNTER
Pt needs refill of her potassium, however, there is backorder of KCl ER 20MEQ. Pharmacy asking if okay to switch to brand for now, JOSE Lancaster pended.     Last OV relevant to medication: 4/24/18  Last refill date: 8/16/18     #/refills: 180+0 (90ds)  When

## 2019-01-23 ENCOUNTER — TELEPHONE (OUTPATIENT)
Dept: INTERNAL MEDICINE CLINIC | Facility: CLINIC | Age: 64
End: 2019-01-23

## 2019-02-10 ENCOUNTER — PATIENT MESSAGE (OUTPATIENT)
Dept: INTERNAL MEDICINE CLINIC | Facility: CLINIC | Age: 64
End: 2019-02-10

## 2019-02-11 ENCOUNTER — PATIENT MESSAGE (OUTPATIENT)
Dept: INTERNAL MEDICINE CLINIC | Facility: CLINIC | Age: 64
End: 2019-02-11

## 2019-02-11 NOTE — TELEPHONE ENCOUNTER
From: Brett Roca  To: Prasanth Mccray MD  Sent: 2/10/2019 9:50 PM CST  Subject: Prescription Question    Zolpidem isn't working anymore. I cannot fall asleep. This has happened to me. A sleep med. will stop working after [employer].  Will you p

## 2019-02-12 NOTE — TELEPHONE ENCOUNTER
From: Jadon Tejeda  To: Viktoriya Brunson MD  Sent: 2/11/2019 6:27 PM CST  Subject: Prescription Question    I have my yearly appt. 4/26. Is that OK or do I still need an appt. NOW before she switches med? I will call Tues. Thank you.

## 2019-02-12 NOTE — TELEPHONE ENCOUNTER
JOHN pt called to schedule apt for medication check/change stated she does not believe her sleeping medication is working. Apt scheduled for med check with  02-14-19.

## 2019-02-14 NOTE — PROGRESS NOTES
Epifanio Ashley is a 59year old female  Patient presents with:  Medication Follow-Up      HPI:   The zolpidem stopped working a few months ago, she can't fall asleep  It takes hours to fall asleep and doesn't stay asleep then  Went to bed at 10 and fe daily. Disp: 180 tablet Rfl: 12   baclofen 10 MG Oral Tab TAKE 3 & 1/2 TABLETS BID AND 1 TAB HS Disp: 720 tablet Rfl: 3   Ketoconazole 2 % External Shampoo Use to cleanse scalp QD Disp: 120 mL Rfl: 11   Cholecalciferol (VITAMIN D3) 5000 units Oral Tab Take Position: Sitting, Cuff Size: adult)   Pulse 79   Temp 97.8 °F (36.6 °C) (Oral)   Resp 14   Ht 72\"   Wt 131 lb 11.2 oz   LMP 12/01/2012   SpO2 93%   BMI 17.86 kg/m²   GENERAL: well developed, well nourished,in no apparent distress  Pt seems drowsy today a

## 2019-02-24 ENCOUNTER — APPOINTMENT (OUTPATIENT)
Dept: GENERAL RADIOLOGY | Facility: HOSPITAL | Age: 64
End: 2019-02-24
Attending: FAMILY MEDICINE
Payer: MEDICARE

## 2019-02-24 ENCOUNTER — APPOINTMENT (OUTPATIENT)
Dept: GENERAL RADIOLOGY | Age: 64
End: 2019-02-24
Attending: FAMILY MEDICINE
Payer: MEDICARE

## 2019-02-24 ENCOUNTER — APPOINTMENT (OUTPATIENT)
Dept: CT IMAGING | Facility: HOSPITAL | Age: 64
End: 2019-02-24
Attending: EMERGENCY MEDICINE
Payer: MEDICARE

## 2019-02-24 ENCOUNTER — HOSPITAL ENCOUNTER (OUTPATIENT)
Age: 64
Discharge: ANOTHER HOSPITAL | End: 2019-02-24
Attending: FAMILY MEDICINE
Payer: MEDICARE

## 2019-02-24 ENCOUNTER — HOSPITAL ENCOUNTER (EMERGENCY)
Facility: HOSPITAL | Age: 64
Discharge: HOME | End: 2019-02-24
Attending: EMERGENCY MEDICINE
Payer: MEDICARE

## 2019-02-24 ENCOUNTER — APPOINTMENT (OUTPATIENT)
Dept: GENERAL RADIOLOGY | Facility: HOSPITAL | Age: 64
End: 2019-02-24
Attending: EMERGENCY MEDICINE
Payer: MEDICARE

## 2019-02-24 VITALS
WEIGHT: 125 LBS | DIASTOLIC BLOOD PRESSURE: 69 MMHG | HEART RATE: 67 BPM | OXYGEN SATURATION: 97 % | TEMPERATURE: 98 F | BODY MASS INDEX: 16.93 KG/M2 | SYSTOLIC BLOOD PRESSURE: 105 MMHG | RESPIRATION RATE: 16 BRPM | HEIGHT: 72 IN

## 2019-02-24 VITALS
HEIGHT: 72 IN | BODY MASS INDEX: 16.93 KG/M2 | SYSTOLIC BLOOD PRESSURE: 100 MMHG | DIASTOLIC BLOOD PRESSURE: 63 MMHG | HEART RATE: 63 BPM | OXYGEN SATURATION: 95 % | WEIGHT: 125 LBS | TEMPERATURE: 97 F | RESPIRATION RATE: 16 BRPM

## 2019-02-24 DIAGNOSIS — M54.50 BACK PAIN, LUMBOSACRAL: ICD-10-CM

## 2019-02-24 DIAGNOSIS — S00.03XA CONTUSION OF SCALP, INITIAL ENCOUNTER: ICD-10-CM

## 2019-02-24 DIAGNOSIS — S20.212A CONTUSION OF RIB ON LEFT SIDE, INITIAL ENCOUNTER: Primary | ICD-10-CM
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
## 2019-02-24 DIAGNOSIS — S09.90XA INJURY OF HEAD, INITIAL ENCOUNTER: ICD-10-CM

## 2019-02-24 LAB
#MXD IC: 0.3 X10ˆ3/UL (ref 0.1–1)
ATRIAL RATE: 68 BPM
CREAT SERPL-MCNC: 1 MG/DL (ref 0.55–1.02)
GLUCOSE BLD-MCNC: 104 MG/DL (ref 70–99)
HCT VFR BLD AUTO: 35.3 % (ref 35–48)
HGB BLD-MCNC: 11.5 G/DL (ref 12–16)
ISTAT BUN: 11 MG/DL (ref 8–20)
ISTAT CHLORIDE: 100 MMOL/L (ref 101–111)
ISTAT HEMATOCRIT: 33 % (ref 34–50)
ISTAT IONIZED CALCIUM: 1.2 MMOL/L
ISTAT POTASSIUM: 3.8 MMOL/L (ref 3.6–5.1)
ISTAT SODIUM: 138 MMOL/L (ref 136–144)
LYMPHOCYTES # BLD AUTO: 1.3 X10ˆ3/UL (ref 1–4)
LYMPHOCYTES NFR BLD AUTO: 24.5 %
MCH RBC QN AUTO: 32.4 PG (ref 26–34)
MCHC RBC AUTO-ENTMCNC: 32.6 G/DL (ref 31–37)
MCV RBC AUTO: 99.4 FL (ref 80–100)
MIXED CELL %: 5.9 %
NEUTROPHILS # BLD AUTO: 3.9 X10ˆ3/UL (ref 1.5–7.7)
NEUTROPHILS NFR BLD AUTO: 69.6 %
P AXIS: 64 DEGREES
P-R INTERVAL: 154 MS
PLATELET # BLD AUTO: 151 X10ˆ3/UL (ref 150–450)
POCT BILIRUBIN URINE: NEGATIVE
POCT GLUCOSE URINE: NEGATIVE MG/DL
POCT KETONE URINE: NEGATIVE MG/DL
POCT NITRITE URINE: NEGATIVE
POCT PH URINE: 6.5 (ref 5–8)
POCT PROTEIN URINE: NEGATIVE MG/DL
POCT SPECIFIC GRAVITY URINE: 1
POCT UROBILINOGEN URINE: 0.2 MG/DL
Q-T INTERVAL: 384 MS
QRS DURATION: 88 MS
QTC CALCULATION (BEZET): 408 MS
R AXIS: 69 DEGREES
RBC # BLD AUTO: 3.55 X10ˆ6/UL (ref 3.8–5.3)
T AXIS: 70 DEGREES
VENTRICULAR RATE: 68 BPM
WBC # BLD AUTO: 5.5 X10ˆ3/UL (ref 4–11)

## 2019-02-24 PROCEDURE — 99215 OFFICE O/P EST HI 40 MIN: CPT

## 2019-02-24 PROCEDURE — 36415 COLL VENOUS BLD VENIPUNCTURE: CPT

## 2019-02-24 PROCEDURE — 80047 BASIC METABLC PNL IONIZED CA: CPT

## 2019-02-24 PROCEDURE — 87086 URINE CULTURE/COLONY COUNT: CPT | Performed by: FAMILY MEDICINE

## 2019-02-24 PROCEDURE — 72110 X-RAY EXAM L-2 SPINE 4/>VWS: CPT | Performed by: EMERGENCY MEDICINE

## 2019-02-24 PROCEDURE — 99205 OFFICE O/P NEW HI 60 MIN: CPT

## 2019-02-24 PROCEDURE — 71045 X-RAY EXAM CHEST 1 VIEW: CPT | Performed by: FAMILY MEDICINE

## 2019-02-24 PROCEDURE — 99284 EMERGENCY DEPT VISIT MOD MDM: CPT

## 2019-02-24 PROCEDURE — 87186 SC STD MICRODIL/AGAR DIL: CPT | Performed by: FAMILY MEDICINE

## 2019-02-24 PROCEDURE — 70450 CT HEAD/BRAIN W/O DYE: CPT | Performed by: EMERGENCY MEDICINE

## 2019-02-24 PROCEDURE — 87088 URINE BACTERIA CULTURE: CPT | Performed by: FAMILY MEDICINE

## 2019-02-24 PROCEDURE — 85025 COMPLETE CBC W/AUTO DIFF WBC: CPT | Performed by: FAMILY MEDICINE

## 2019-02-24 PROCEDURE — 96374 THER/PROPH/DIAG INJ IV PUSH: CPT

## 2019-02-24 PROCEDURE — 71100 X-RAY EXAM RIBS UNI 2 VIEWS: CPT | Performed by: FAMILY MEDICINE

## 2019-02-24 PROCEDURE — 93010 ELECTROCARDIOGRAM REPORT: CPT

## 2019-02-24 PROCEDURE — 81002 URINALYSIS NONAUTO W/O SCOPE: CPT | Performed by: FAMILY MEDICINE

## 2019-02-24 PROCEDURE — 93005 ELECTROCARDIOGRAM TRACING: CPT

## 2019-02-24 PROCEDURE — 93010 ELECTROCARDIOGRAM REPORT: CPT | Performed by: INTERNAL MEDICINE

## 2019-02-24 RX ORDER — ACETAMINOPHEN 500 MG
1000 TABLET ORAL ONCE
Status: COMPLETED | OUTPATIENT
Start: 2019-02-24 | End: 2019-02-24

## 2019-02-24 RX ORDER — KETOROLAC TROMETHAMINE 30 MG/ML
30 INJECTION, SOLUTION INTRAMUSCULAR; INTRAVENOUS ONCE
Status: COMPLETED | OUTPATIENT
Start: 2019-02-24 | End: 2019-02-24

## 2019-02-24 RX ORDER — IBUPROFEN 600 MG/1
600 TABLET ORAL EVERY 8 HOURS PRN
Qty: 30 TABLET | Refills: 0 | Status: SHIPPED | OUTPATIENT
Start: 2019-02-24 | End: 2019-03-06

## 2019-02-24 NOTE — ED INITIAL ASSESSMENT (HPI)
Fall - unwitnessed at home after slipping on rug, pt states her left ribs hurt and she did hit her head, has a headache in the back of her head. Pt states she hit her head first then her back. Pt fell down 6 wooden stairs. Pt accompanied by her partner.   D

## 2019-02-24 NOTE — ED PROVIDER NOTES
Patient Seen in: [facility] Emergency Department    History   Patient presents with:  Fall (musculoskeletal, neurologic)    Stated Complaint: fall down 6 wooden steps, +hit head, denies loc, denies n/v    HPI    52-year-old female presents emergency failure    • H/O sinus tachycardia    • Hematuria    • High cholesterol    • History of bone density study 8/4/2011   • History of depression    • History of insomnia    • History of oral surgery     7 root canals   • History of renal stone    • History of • Unspecified hypothyroidism    • URI (upper respiratory infection)    • URI (upper respiratory infection)    • Vaginal lump    • Vegetarian    • Viral syndrome    • Vitamin D deficiency 7/9/2015    On IM vitamin D q 6 mos w/endocrine   • Volvulus ([facility] bowel resection   • OTHER SURGICAL HISTORY  5/2009    COLECTOMY, subtotal, for volvulus   • OTHER SURGICAL HISTORY  2009    ileus surgery   • OTHER SURGICAL HISTORY  8/1/13    Flow US, Cysto - Dr. Sapp Noss    • OTHER SURGICAL HISTORY  02/2013    intestional s in no acute distress  HEENT: Pupils equal react to light extraocular muscles intact no scleral icterus, mucous membranes are moist, there is no erythema or exudate in the posterior pharynx there is a small superficial laceration above the left eye.   Tender sided lumbar pain that is shooting down her left leg. FINDINGS: Alignment is normal. Vertebral body heights are maintained throughout the lumbar spine. Disc spaces are maintained throughout the lumbar spine.  Mild facet hypertrophic changes at L5-S1 are

## 2019-02-24 NOTE — ED INITIAL ASSESSMENT (HPI)
Pt presents to ER status post slip and fall on wooden steps. 6 steps. Pt is coming for urgent care where she is was told she had a broken rib. Pt states that she fell on her head, no LOC. C/o lower back pain that radiates down her legs.  Pt is speaking kasandra

## 2019-02-24 NOTE — ED PROVIDER NOTES
Patient Seen in: [address] [facility]    History   Patient presents with:  Fall (musculoskeletal, neurologic)    Stated Complaint: pt fell at home and injuried left side    HPI    28-year-old female presents today for evaluation of a Folate deficiency    • H/O elevated homocysteine    • H/O pharyngitis    • H/O respiratory failure    • H/O sinus tachycardia    • Hematuria    • High cholesterol    • History of bone density study 8/4/2011   • History of depression    • History of insomni • Thrombocytopenia ([facility] [facility]    • Thyroid nodule     \"s/p resection\"   • Trigeminal neuralgia    • Unspecified hypothyroidism    • URI (upper respiratory infection)    • URI (upper respiratory infection)    • Vaginal lump    • Vegetarian    • Viral syndrom duodenitis but no obvious sprue   • OTHER SURGICAL HISTORY      EXPLORATORY LAPAROTOMY, with bowel resection   • OTHER SURGICAL HISTORY  5/2009    COLECTOMY, subtotal, for volvulus   • OTHER SURGICAL HISTORY  2009    ileus surgery   • OTHER SURGICAL HISTOR 12/01/2012   SpO2 97%   BMI 16.95 kg/m²         Physical Exam    Patient is alert oriented ×3 in no acute distress   conjunctiva clear no icterus, pupils equally react to light bilaterally, extraocular movement intact.   There is a small non-gaping 1 cm lac pneumothorax. FINDINGS:  LUNGS:  Patient is rotated.   No focal consolidation, pleural effusion, or large pneumothorax appreciated on this single portable AP view of the chest.  The lung apices are not included on this single view limiting evaluation for treatment. Disposition and Plan     Clinical Impression:  Contusion of rib on left side, initial encounter  (primary encounter diagnosis)  Injury of head, initial encounter  Fall, initial encounter    Disposition:   Ic to ed  2/24/2019 10:50 am

## 2019-03-03 ENCOUNTER — HOSPITAL ENCOUNTER (EMERGENCY)
Facility: HOSPITAL | Age: 64
Discharge: HOME | End: 2019-03-04
Attending: EMERGENCY MEDICINE
Payer: MEDICARE

## 2019-03-03 VITALS
RESPIRATION RATE: 16 BRPM | SYSTOLIC BLOOD PRESSURE: 98 MMHG | DIASTOLIC BLOOD PRESSURE: 67 MMHG | OXYGEN SATURATION: 92 % | TEMPERATURE: 97 F | BODY MASS INDEX: 16.25 KG/M2 | HEART RATE: 57 BPM | HEIGHT: 72 IN | WEIGHT: 120 LBS

## 2019-03-03 DIAGNOSIS — R42 VERTIGO: Primary | ICD-10-CM

## 2019-03-03 DIAGNOSIS — N39.0 URINARY TRACT INFECTION WITHOUT HEMATURIA, SITE UNSPECIFIED: ICD-10-CM

## 2019-03-03 LAB
ALBUMIN SERPL-MCNC: 3.7 G/DL (ref 3.4–5)
ALBUMIN/GLOB SERPL: 1.1 {RATIO} (ref 1–2)
ALP LIVER SERPL-CCNC: 63 U/L (ref 50–130)
ALT SERPL-CCNC: 18 U/L (ref 13–56)
ANION GAP SERPL CALC-SCNC: 7 MMOL/L (ref 0–18)
AST SERPL-CCNC: 17 U/L (ref 15–37)
BASOPHILS # BLD AUTO: 0.04 X10(3) UL (ref 0–0.2)
BASOPHILS NFR BLD AUTO: 0.5 %
BILIRUB SERPL-MCNC: 0.6 MG/DL (ref 0.1–2)
BILIRUB UR QL STRIP.AUTO: NEGATIVE
BUN BLD-MCNC: 11 MG/DL (ref 7–18)
BUN/CREAT SERPL: 12 (ref 10–20)
CALCIUM BLD-MCNC: 9.2 MG/DL (ref 8.5–10.1)
CHLORIDE SERPL-SCNC: 105 MMOL/L (ref 98–107)
CO2 SERPL-SCNC: 29 MMOL/L (ref 21–32)
COLOR UR AUTO: YELLOW
CREAT BLD-MCNC: 0.92 MG/DL (ref 0.55–1.02)
DEPRECATED RDW RBC AUTO: 49.5 FL (ref 35.1–46.3)
EOSINOPHIL # BLD AUTO: 0.23 X10(3) UL (ref 0–0.7)
EOSINOPHIL NFR BLD AUTO: 3.1 %
ERYTHROCYTE [DISTWIDTH] IN BLOOD BY AUTOMATED COUNT: 13.6 % (ref 11–15)
GLOBULIN PLAS-MCNC: 3.5 G/DL (ref 2.8–4.4)
GLUCOSE BLD-MCNC: 163 MG/DL (ref 70–99)
GLUCOSE UR STRIP.AUTO-MCNC: NEGATIVE MG/DL
HCT VFR BLD AUTO: 37.8 % (ref 35–48)
HGB BLD-MCNC: 12.1 G/DL (ref 12–16)
HYALINE CASTS #/AREA URNS AUTO: PRESENT /LPF
IMM GRANULOCYTES # BLD AUTO: 0.01 X10(3) UL (ref 0–1)
IMM GRANULOCYTES NFR BLD: 0.1 %
KETONES UR STRIP.AUTO-MCNC: NEGATIVE MG/DL
LYMPHOCYTES # BLD AUTO: 0.91 X10(3) UL (ref 1–4)
LYMPHOCYTES NFR BLD AUTO: 12.3 %
M PROTEIN MFR SERPL ELPH: 7.2 G/DL (ref 6.4–8.2)
MCH RBC QN AUTO: 31.8 PG (ref 26–34)
MCHC RBC AUTO-ENTMCNC: 32 G/DL (ref 31–37)
MCV RBC AUTO: 99.5 FL (ref 80–100)
MONOCYTES # BLD AUTO: 0.38 X10(3) UL (ref 0.1–1)
MONOCYTES NFR BLD AUTO: 5.2 %
NEUTROPHILS # BLD AUTO: 5.8 X10 (3) UL (ref 1.5–7.7)
NEUTROPHILS # BLD AUTO: 5.8 X10(3) UL (ref 1.5–7.7)
NEUTROPHILS NFR BLD AUTO: 78.8 %
NITRITE UR QL STRIP.AUTO: POSITIVE
OSMOLALITY SERPL CALC.SUM OF ELEC: 295 MOSM/KG (ref 275–295)
PH UR STRIP.AUTO: 6 [PH] (ref 4.5–8)
PLATELET # BLD AUTO: 187 10(3)UL (ref 150–450)
POTASSIUM SERPL-SCNC: 4 MMOL/L (ref 3.5–5.1)
PROT UR STRIP.AUTO-MCNC: 30 MG/DL
RBC # BLD AUTO: 3.8 X10(6)UL (ref 3.8–5.3)
RBC #/AREA URNS AUTO: >10 /HPF
RBC UR QL AUTO: NEGATIVE
SODIUM SERPL-SCNC: 141 MMOL/L (ref 136–145)
SP GR UR STRIP.AUTO: 1.02 (ref 1–1.03)
TSI SER-ACNC: 0.37 MIU/ML (ref 0.36–3.74)
UROBILINOGEN UR STRIP.AUTO-MCNC: 0.2 MG/DL
WBC # BLD AUTO: 7.4 X10(3) UL (ref 4–11)
WBC #/AREA URNS AUTO: >50 /HPF
WBC CLUMPS UR QL AUTO: PRESENT
YEAST URINE: PRESENT

## 2019-03-03 PROCEDURE — 87186 SC STD MICRODIL/AGAR DIL: CPT | Performed by: EMERGENCY MEDICINE

## 2019-03-03 PROCEDURE — 99285 EMERGENCY DEPT VISIT HI MDM: CPT

## 2019-03-03 PROCEDURE — 93005 ELECTROCARDIOGRAM TRACING: CPT

## 2019-03-03 PROCEDURE — 87088 URINE BACTERIA CULTURE: CPT | Performed by: EMERGENCY MEDICINE

## 2019-03-03 PROCEDURE — 84443 ASSAY THYROID STIM HORMONE: CPT | Performed by: EMERGENCY MEDICINE

## 2019-03-03 PROCEDURE — 80053 COMPREHEN METABOLIC PANEL: CPT | Performed by: EMERGENCY MEDICINE

## 2019-03-03 PROCEDURE — 99284 EMERGENCY DEPT VISIT MOD MDM: CPT

## 2019-03-03 PROCEDURE — 87086 URINE CULTURE/COLONY COUNT: CPT | Performed by: EMERGENCY MEDICINE

## 2019-03-03 PROCEDURE — 96361 HYDRATE IV INFUSION ADD-ON: CPT

## 2019-03-03 PROCEDURE — 96374 THER/PROPH/DIAG INJ IV PUSH: CPT

## 2019-03-03 PROCEDURE — 81001 URINALYSIS AUTO W/SCOPE: CPT | Performed by: EMERGENCY MEDICINE

## 2019-03-03 PROCEDURE — 96375 TX/PRO/DX INJ NEW DRUG ADDON: CPT

## 2019-03-03 PROCEDURE — 93010 ELECTROCARDIOGRAM REPORT: CPT

## 2019-03-03 PROCEDURE — 85025 COMPLETE CBC W/AUTO DIFF WBC: CPT | Performed by: EMERGENCY MEDICINE

## 2019-03-03 RX ORDER — LORAZEPAM 2 MG/ML
0.5 INJECTION INTRAMUSCULAR ONCE
Status: COMPLETED | OUTPATIENT
Start: 2019-03-03 | End: 2019-03-03

## 2019-03-03 RX ORDER — SULFAMETHOXAZOLE AND TRIMETHOPRIM 800; 160 MG/1; MG/1
1 TABLET ORAL ONCE
Status: COMPLETED | OUTPATIENT
Start: 2019-03-03 | End: 2019-03-04

## 2019-03-03 RX ORDER — MECLIZINE HYDROCHLORIDE 25 MG/1
25 TABLET ORAL ONCE
Status: COMPLETED | OUTPATIENT
Start: 2019-03-03 | End: 2019-03-03

## 2019-03-03 RX ORDER — ONDANSETRON 2 MG/ML
4 INJECTION INTRAMUSCULAR; INTRAVENOUS ONCE
Status: COMPLETED | OUTPATIENT
Start: 2019-03-03 | End: 2019-03-03

## 2019-03-03 RX ORDER — MECLIZINE HYDROCHLORIDE 25 MG/1
25 TABLET ORAL 3 TIMES DAILY PRN
Qty: 20 TABLET | Refills: 0 | Status: SHIPPED | OUTPATIENT
Start: 2019-03-03 | End: 2019-04-26 | Stop reason: ALTCHOICE

## 2019-03-03 RX ORDER — SULFAMETHOXAZOLE AND TRIMETHOPRIM 800; 160 MG/1; MG/1
1 TABLET ORAL 2 TIMES DAILY
Qty: 14 TABLET | Refills: 0 | Status: SHIPPED | OUTPATIENT
Start: 2019-03-03 | End: 2019-03-10

## 2019-03-04 LAB
ATRIAL RATE: 63 BPM
P AXIS: 53 DEGREES
P-R INTERVAL: 134 MS
Q-T INTERVAL: 430 MS
QRS DURATION: 94 MS
QTC CALCULATION (BEZET): 440 MS
R AXIS: 66 DEGREES
T AXIS: 65 DEGREES
VENTRICULAR RATE: 63 BPM

## 2019-03-04 NOTE — ED INITIAL ASSESSMENT (HPI)
Pt to er with c.c. Vertigo pt has had several falls pt last seen in ED for falls 5 days ago. Pt c.o. Lt back pain on ribs, pt states she has a fx rib. Pt c.o.  General weakness in AM when she first get up and is unable to ambulate well, pt states she does

## 2019-03-04 NOTE — ED PROVIDER NOTES
Patient Seen in: [facility] Emergency Department    History   Patient presents with:  Trauma (cardiovascular, musculoskeletal)    Stated Complaint: Rib pain S/P fall; Dizzy    HPI    Patient is a 71-year-old female presents emergency room for evalua constipation    • Chronic interstitial cystitis     Chronic interstitial cystitis   • Chronic LBP    • Colonic dysmotility 2011    chronic   • Confusional state    • Constipation     hx chronic constipation   • Contusion of wrist    • Costochondritis    • 7/9/2015   • Palpitations     benign   • Paresthesia    • Peripheral neuropathy    • Pes planus     and hyperpronation   • Pleural effusion    • PND (post-nasal drip)    • Pneumatosis cystoides intestinalis    • Pneumonia    • Reflux    • Rhabdomyolysis 3/ normal, int hemorrhoids   • FLEXIBLE SIGMOIDOSCOPY, POSSIBLE BIOPSY, POSSIBLE POLYPECTOMY N/A 9/25/2018    Performed by Carmen Bass MD at [facility] 02/2013    hernia repair   • IMPLANT LEFT  2013   • IMPLANT RIGHT oz    Drug use: No      Review of Systems    Positive for stated complaint: Rib pain S/P fall; Dizzy  Other systems are as noted in HPI. Constitutional and vital signs reviewed. All other systems reviewed and negative except as noted above.     Physic Clarity Urine Cloudy (*)     Protein Urine 30  (*)     Nitrite Urine Positive (*)     Leukocyte Esterase Urine Large (*)     WBC Urine >50 (*)     RBC URINE >10 (*)     Bacteria Urine Rare (*)     Squamous Epi.  Cells Large (*)     Hyaline Casts Present (*) better. Patient ambulated the bathroom. I watched patient ambulate to the bathroom with steady gait unassisted. MDM   Patient is a 67-year-old female presents emergency room with vertigo. HINTS exam negative. Symptoms consistent with labyrinthitis. days., Print Script, Disp-14 tablet, R-0    Meclizine HCl 25 MG Oral Tab  Take 1 tablet (25 mg total) by mouth 3 (three) times daily as needed for Dizziness., Normal, Disp-20 tablet, R-0

## 2019-03-07 NOTE — TELEPHONE ENCOUNTER
Last OV relevant to medication: 2/14/2019  Last refill date: 2/14/19     #/refills: 30/0  When pt was asked to return for OV: None noted  Upcoming appt/reason: 4/26/2019 - RALF

## 2019-03-08 ENCOUNTER — PATIENT MESSAGE (OUTPATIENT)
Dept: INTERNAL MEDICINE CLINIC | Facility: CLINIC | Age: 64
End: 2019-03-08

## 2019-03-08 RX ORDER — ESZOPICLONE 3 MG/1
TABLET, FILM COATED ORAL
Qty: 30 TABLET | Refills: 1 | Status: SHIPPED
Start: 2019-03-08 | End: 2019-04-26

## 2019-03-08 RX ORDER — ESZOPICLONE 3 MG/1
TABLET, FILM COATED ORAL
Qty: 30 TABLET | Refills: 0 | OUTPATIENT
Start: 2019-03-08

## 2019-03-12 NOTE — TELEPHONE ENCOUNTER
From: Ngozi Yanez  To: Patricia Monahan MD  Sent: 3/8/2019 5:05 PM CST  Subject: Prescription Question    The pharmacy said my request for Lunesta was denied. Please refill. It helps me sleep.

## 2019-03-12 NOTE — PROGRESS NOTES
Rx was faxed in on 3/8/19, same day as msg. Left detailed msg with pharmacy in case they did not get that fax.

## 2019-04-17 PROCEDURE — 87046 STOOL CULTR AEROBIC BACT EA: CPT | Performed by: INTERNAL MEDICINE

## 2019-04-17 PROCEDURE — 87493 C DIFF AMPLIFIED PROBE: CPT | Performed by: INTERNAL MEDICINE

## 2019-04-17 PROCEDURE — 84302 ASSAY OF SWEAT SODIUM: CPT | Performed by: INTERNAL MEDICINE

## 2019-04-17 PROCEDURE — 87329 GIARDIA AG IA: CPT | Performed by: INTERNAL MEDICINE

## 2019-04-17 PROCEDURE — 83993 ASSAY FOR CALPROTECTIN FECAL: CPT | Performed by: INTERNAL MEDICINE

## 2019-04-17 PROCEDURE — 87427 SHIGA-LIKE TOXIN AG IA: CPT | Performed by: INTERNAL MEDICINE

## 2019-04-17 PROCEDURE — 87045 FECES CULTURE AEROBIC BACT: CPT | Performed by: INTERNAL MEDICINE

## 2019-04-17 PROCEDURE — 82705 FATS/LIPIDS FECES QUAL: CPT | Performed by: INTERNAL MEDICINE

## 2019-04-17 PROCEDURE — 84999 UNLISTED CHEMISTRY PROCEDURE: CPT | Performed by: INTERNAL MEDICINE

## 2019-04-17 PROCEDURE — 82656 EL-1 FECAL QUAL/SEMIQ: CPT | Performed by: INTERNAL MEDICINE

## 2019-04-17 PROCEDURE — 87209 SMEAR COMPLEX STAIN: CPT | Performed by: INTERNAL MEDICINE

## 2019-04-17 PROCEDURE — 87177 OVA AND PARASITES SMEARS: CPT | Performed by: INTERNAL MEDICINE

## 2019-04-17 PROCEDURE — 87272 CRYPTOSPORIDIUM AG IF: CPT | Performed by: INTERNAL MEDICINE

## 2019-04-17 PROCEDURE — 87077 CULTURE AEROBIC IDENTIFY: CPT | Performed by: INTERNAL MEDICINE

## 2019-04-26 ENCOUNTER — OFFICE VISIT (OUTPATIENT)
Dept: INTERNAL MEDICINE CLINIC | Facility: CLINIC | Age: 64
End: 2019-04-26
Payer: MEDICARE

## 2019-04-26 VITALS
WEIGHT: 136.19 LBS | SYSTOLIC BLOOD PRESSURE: 100 MMHG | OXYGEN SATURATION: 98 % | RESPIRATION RATE: 14 BRPM | HEART RATE: 75 BPM | BODY MASS INDEX: 18.65 KG/M2 | DIASTOLIC BLOOD PRESSURE: 60 MMHG | HEIGHT: 71.77 IN | TEMPERATURE: 98 F

## 2019-04-26 DIAGNOSIS — Z12.31 ENCOUNTER FOR SCREENING MAMMOGRAM FOR BREAST CANCER: Primary | ICD-10-CM

## 2019-04-26 DIAGNOSIS — Z00.00 ENCOUNTER FOR ANNUAL HEALTH EXAMINATION: ICD-10-CM

## 2019-04-26 DIAGNOSIS — F51.01 PRIMARY INSOMNIA: ICD-10-CM

## 2019-04-26 DIAGNOSIS — Z23 NEED FOR VACCINATION: ICD-10-CM

## 2019-04-26 PROBLEM — M54.32 SCIATICA OF LEFT SIDE: Status: ACTIVE | Noted: 2019-04-26

## 2019-04-26 PROBLEM — S22.32XD CLOSED FRACTURE OF ONE RIB OF LEFT SIDE WITH ROUTINE HEALING: Status: ACTIVE | Noted: 2019-04-26

## 2019-04-26 PROCEDURE — G0439 PPPS, SUBSEQ VISIT: HCPCS | Performed by: INTERNAL MEDICINE

## 2019-04-26 PROCEDURE — 90732 PPSV23 VACC 2 YRS+ SUBQ/IM: CPT | Performed by: INTERNAL MEDICINE

## 2019-04-26 PROCEDURE — 99213 OFFICE O/P EST LOW 20 MIN: CPT | Performed by: INTERNAL MEDICINE

## 2019-04-26 PROCEDURE — G0009 ADMIN PNEUMOCOCCAL VACCINE: HCPCS | Performed by: INTERNAL MEDICINE

## 2019-04-26 RX ORDER — ESZOPICLONE 3 MG/1
TABLET, FILM COATED ORAL
Qty: 30 TABLET | Refills: 5 | Status: SHIPPED
Start: 2019-04-26 | End: 2019-07-02 | Stop reason: ALTCHOICE

## 2019-04-26 RX ORDER — POTASSIUM CHLORIDE 20 MEQ/1
40 TABLET, EXTENDED RELEASE ORAL DAILY
Qty: 180 TABLET | Refills: 1 | Status: CANCELLED | OUTPATIENT
Start: 2019-04-26

## 2019-04-26 NOTE — PROGRESS NOTES
HPI:   Nidhi Alexander is a 59year old female who presents for a Medicare Initial Annual Wellness visit (Once after 12 month Medicare anniversary) .     I treat her for chronic insomnia and chronic pain    She has new \"boomer headaches\" tried exced below:  She has no issues with dressing and bathing based on screening of functional status. She has Meal Preparation difficulties based on screening of functional status.    Preparing your meals: (P) Need some help  She has difficulties Managing (GASTROENTEROLOGY)    Patient Active Problem List:     Colonic dysmotility     Acquired hypothyroidism     Fibromyalgia     Chronic interstitial cystitis     Osteoarthritis     Trigeminal neuralgia     Migraines     Chronic LBP     Basal cell carcinoma of 0.1 % External Ointment Apply to aa bid x1-2 weeks, then taper to prn. Fluticasone Propionate 50 MCG/ACT Nasal Suspension USE 2 SPRAYS IN EACH NOSTRIL DAILY   KLOR-CON M20 20 MEQ Oral Tab CR Take 2 tablets (40 mEq total) by mouth daily.    Alfuzosin HCl E Hematuria, High cholesterol, History of bone density study (8/4/2011), History of depression, History of insomnia, History of oral surgery, History of renal stone, History of TMJ disorder, History of UTI, HPV in female (1990s, then 2014, 15), Hypersomnia, (3/25/2008); thyroid lobectomy,unilat (5/19/1994); cholecystectomy (05/01/2013); hernia surgery (02/2013); skin surgery (2-25-15); other (11/7/2014); other surgical history (3/16/09  ASC); other surgical history (6-28-10); other surgical history (7-8-10); for AWV/SWV)    Finger Rub normal b/l       Visual Acuity                               Vaccination History     Immunization History   Administered Date(s) Administered   • TD 02/21/2006   • Technetium Tc99mm Sestamibi, Iv, Up To 40 Millicuries 52/26/6337 Initial Preventative Physical Exam only, or if medically necessary Electrocardiogram date12/18/2017       Colorectal Cancer Screening      Colonoscopy Screen every 10 years Colonoscopy due on 09/25/2023 Update Health Maintenance if applicable    Flex [employer] house as a HepB virus carrier   Homosexual men   Illicit injectable drug abusers     Tetanus Toxoid  Only covered with a cut with metal- TD and TDaP Not covered by Medicare Part B 02/21/2006    NA This may be covered with your prescription benefits, but Me

## 2019-04-26 NOTE — PROGRESS NOTES
Patient had Pneumovax 23 vaccine today at office visit. Patient left the appt and went to the bathroom in the building and noticed that her right under eye had become swollen and puffy. Patient came back in the office, Dr. Stephanie Root notified.  I triaged patient

## 2019-04-26 NOTE — PATIENT INSTRUCTIONS
Gael Goltz Doering's SCREENING SCHEDULE   Tests on this list are recommended by your physician but may not be covered, or covered at this frequency, by your insurer. Please check with your insurance carrier before scheduling to verify coverage.    PREVEN years old and have smoked more than 100 cigarettes in their lifetime   • Anyone with a family history    Colorectal Cancer Screening  Covered up to Age 76     Colonoscopy Screen   Covered every 10 years- more often if abnormal Colonoscopy due on 09/25/2023 Influenza  Covered Annually No results found. However, due to the size of the patient record, not all encounters were searched. Please check Results Review for a complete set of results.  Please get every year    Pneumococcal 13 (Prevnar)  Covered Once afte Recommended Websites for Advanced Directives    [employer]. org/publications/Documents/personal_dec. pdf  An information packet, including necessary form from the [employer] website. http://www. [place]state. [place]/public/books/a

## 2019-05-01 ENCOUNTER — PATIENT MESSAGE (OUTPATIENT)
Dept: INTERNAL MEDICINE CLINIC | Facility: CLINIC | Age: 64
End: 2019-05-01

## 2019-05-01 NOTE — PROGRESS NOTES
Pt sent 500Friendshart question about 4/17/19 UIBC (unsaturated iron binding capacity) result. Concerned about her abnormal result of 352. Please advise.

## 2019-05-01 NOTE — TELEPHONE ENCOUNTER
From: Vanessa Samuel  To: Collette Swain MD  Sent: 5/1/2019 12:08 AM CDT  Subject: Test Results Question    What is UIBC? Mine is 352 & normal is . What is it & what does it mean? Thank you. It was good to see you!

## 2019-05-06 PROCEDURE — 87493 C DIFF AMPLIFIED PROBE: CPT | Performed by: INTERNAL MEDICINE

## 2019-07-15 PROCEDURE — 87186 SC STD MICRODIL/AGAR DIL: CPT | Performed by: OBSTETRICS & GYNECOLOGY

## 2019-07-15 PROCEDURE — 87077 CULTURE AEROBIC IDENTIFY: CPT | Performed by: OBSTETRICS & GYNECOLOGY

## 2019-07-15 PROCEDURE — 87086 URINE CULTURE/COLONY COUNT: CPT | Performed by: OBSTETRICS & GYNECOLOGY

## 2019-07-31 ENCOUNTER — TELEPHONE (OUTPATIENT)
Dept: INTERNAL MEDICINE CLINIC | Facility: CLINIC | Age: 64
End: 2019-07-31

## 2019-07-31 NOTE — TELEPHONE ENCOUNTER
Incoming (mail or fax):   Fax  Received from[facility]   Documentation given to: Triage (pre-op bin)     *LM for pt to return call regarding apt

## 2019-08-01 NOTE — TELEPHONE ENCOUNTER
Received form requesting pre-op clearance and H&P for patient with urological surgery scheduled for 9/25/19. No pre-op appointment currently scheduled, PAT to do pre-op testing. Form placed with [facility]. DAISY GOSS to schedule pre-op appointment.

## 2019-08-14 ENCOUNTER — MED REC SCAN ONLY (OUTPATIENT)
Dept: INTERNAL MEDICINE CLINIC | Facility: CLINIC | Age: 64
End: 2019-08-14

## 2019-09-13 ENCOUNTER — OFFICE VISIT (OUTPATIENT)
Dept: INTERNAL MEDICINE CLINIC | Facility: CLINIC | Age: 64
End: 2019-09-13
Payer: MEDICARE

## 2019-09-13 VITALS
TEMPERATURE: 98 F | HEIGHT: 71.5 IN | OXYGEN SATURATION: 98 % | WEIGHT: 136.38 LBS | RESPIRATION RATE: 14 BRPM | DIASTOLIC BLOOD PRESSURE: 64 MMHG | SYSTOLIC BLOOD PRESSURE: 110 MMHG | HEART RATE: 66 BPM | BODY MASS INDEX: 18.68 KG/M2

## 2019-09-13 DIAGNOSIS — E03.9 ACQUIRED HYPOTHYROIDISM: ICD-10-CM

## 2019-09-13 DIAGNOSIS — K59.9 COLONIC DYSMOTILITY: ICD-10-CM

## 2019-09-13 DIAGNOSIS — G50.0 TRIGEMINAL NEURALGIA: ICD-10-CM

## 2019-09-13 DIAGNOSIS — N31.9 NEUROGENIC BLADDER: ICD-10-CM

## 2019-09-13 DIAGNOSIS — M79.7 FIBROMYALGIA: ICD-10-CM

## 2019-09-13 DIAGNOSIS — Z01.818 PREOPERATIVE CLEARANCE: Primary | ICD-10-CM

## 2019-09-13 PROCEDURE — 99214 OFFICE O/P EST MOD 30 MIN: CPT | Performed by: INTERNAL MEDICINE

## 2019-09-13 RX ORDER — ESZOPICLONE 3 MG/1
1 TABLET, FILM COATED ORAL NIGHTLY
Refills: 5 | COMMUNITY
Start: 2019-07-21 | End: 2019-12-24

## 2019-09-13 NOTE — PROGRESS NOTES
Zahida Castano is a 62year old female who presents for a pre-operative physical exam. She is going to have a bladder stim on 9/24/19 and 9/10/19    HPI:   She is under treatment for chronic pain, insomnia, fibromyalgia, SACHIN compliant w/CPAP, hypothyr (VITAMIN D3) 5000 units Oral Tab Take by mouth daily. Disp:  Rfl:    Melatonin 1 MG Oral Cap Take 3 mg by mouth daily. Disp:  Rfl:    acetaminophen 500 MG Oral Tab Take 1,000 mg by mouth every 6 (six) hours as needed for Pain.    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 coli UTI    • Early satiety    • Elevated CPK    • Environmental allergies    • Epigastric pain    • Excessive menstruation     Nilsa (restless legs syndrome) 9/10/2008   • Sciatica of left side 4/26/2019   • Sepsis(995.91) 2008    pneumonia sepsis   • Sicca ([facility] [address] 2011   • Sinus tachycardia    • Sleep apnea    • Sleep apnea, obstructive PSG 11-5-10 Oral TX 6-6-12    AHI 14 Supine 36 Posi 2013    hernia repair   • IMPLANT LEFT     • IMPLANT RIGHT     •   1885,    • OTHER  2014    colposcopy with Dr. Nohemi Martell   • OTHER SURGICAL HISTORY  3/16/09  ASC    EGD (epig pain, early satiety): Normal   • OTHER SURGICAL HISTORY • Other (Kanorado Comfort) Mother    • Other Mother    • Gastro-Intestinal Disorder Son         Chronic diarrhea   • Other (Sigmoid volvulus) Sister    • Other (Migraine HA) Son    • Arthritis Maternal Grandfather    • Diabetes Other         pat uncle developed, well nourished,in no apparent distress  SKIN: no rashes,no suspicious lesions  HEENT: atraumatic, normocephalic,ears and throat are clear  EYES:PERRLA, EOMI, normal optic disk,conjunctiva are clear  NECK: supple,no adenopathy,no bruits  LUNGS: c

## 2019-10-16 ENCOUNTER — MED REC SCAN ONLY (OUTPATIENT)
Dept: INTERNAL MEDICINE CLINIC | Facility: CLINIC | Age: 64
End: 2019-10-16

## 2019-10-28 RX ORDER — POTASSIUM CHLORIDE 20 MEQ/1
40 TABLET, EXTENDED RELEASE ORAL DAILY
Qty: 180 TABLET | Refills: 1 | Status: SHIPPED | OUTPATIENT
Start: 2019-10-28 | End: 2020-04-20

## 2019-10-28 RX ORDER — ACETAMINOPHEN AND CODEINE PHOSPHATE 300; 30 MG/1; MG/1
TABLET ORAL
COMMUNITY
Start: 2019-10-15 | End: 2020-05-14 | Stop reason: ALTCHOICE

## 2019-12-24 NOTE — TELEPHONE ENCOUNTER
Last OV relevant to medication: 4/26/19  Last refill date: 7/21/19     #/refills: ?/5 (External/Patient Reported)  When pt was asked to return for OV: none noted  Upcoming appt/reason: 1/2/20, chantix

## 2020-01-02 PROBLEM — R30.0 DYSURIA: Status: ACTIVE | Noted: 2017-07-01

## 2020-01-02 PROBLEM — K59.00 CONSTIPATION: Status: ACTIVE | Noted: 2020-01-02

## 2020-01-02 PROBLEM — R07.81 RIB PAIN: Status: ACTIVE | Noted: 2017-05-22

## 2020-01-02 PROBLEM — T84.84XA PAIN IN BONE FIXATION DEVICE (HCC): Status: ACTIVE | Noted: 2017-12-07

## 2020-01-02 PROBLEM — I77.3 FIBROMUSCULAR DYSPLASIA (HCC): Status: ACTIVE | Noted: 2018-07-26

## 2020-01-02 PROBLEM — R42 VERTIGO: Status: ACTIVE | Noted: 2018-07-26

## 2020-01-02 PROBLEM — N30.00 ACUTE CYSTITIS WITHOUT HEMATURIA: Status: ACTIVE | Noted: 2017-07-01

## 2020-01-02 NOTE — PATIENT INSTRUCTIONS
FU in April for your annual wellness visit    Start the nicotine patches as directed. Take off at bedtime to prevent nightmares. How to Quit Smoking  Smoking is a hard habit to break. About half of all people who have ever smoked have been able to quit.  Sky Martinez Support at home is important too. Family and friends can offer praise and reassurance. If the smoker in your life finds it hard to quit, encourage them to keep trying. Try over-the-counter medicine  Nicotine replacement therapy may make it easier to quit.

## 2020-01-02 NOTE — PROGRESS NOTES
Jordin Rogers is a 62year old female. CHIEF COMPLAINT   Smoking, wants to quit    HPI:   The patient started smoking last summer. She smokes about 3-4 cigarettes per day. She is wanting to start chantix to quit smoking.  She tried the gum and it did by mouth every 6 (six) hours as needed for Pain. • Saline 0.2 % Nasal Solution by Nasal route nightly. • Glucosamine-Chondroit-Vit C-Mn (GLUCOSAMINE CHONDR 1500 COMPLX) Oral Cap Take 1 tablet by mouth daily.      • Levothyroxine Sodium 75 MCG Or oral surgery     7 root canals   • History of renal stone    • History of TMJ disorder    • History of UTI    • HPV in female 1990s, then 2014, 16    ASCUS 9130 w/HPV+, cert letter sent   • Hypersomnia    • Hypokalemia    • Hypothyroidism    • Hypovolemia Vaginal lump    • Vegetarian    • Viral syndrome    • Vitamin D deficiency 7/9/2015    On IM vitamin D q 6 mos w/endocrine   • Volvulus (HCC)       Social History:  Social History    Tobacco Use      Smoking status: Current Every Day Smoker        Types: C AGRATIO 1.8 04/14/2014     03/03/2019    K 4.0 03/03/2019     03/03/2019    CO2 29.0 03/03/2019      Lab Results   Component Value Date    CHOLEST 220 (H) 04/26/2018    TRIG 227 (H) 04/26/2018    HDL 66 04/26/2018     04/26/2018    VLDL

## 2020-02-03 ENCOUNTER — TELEPHONE (OUTPATIENT)
Dept: INTERNAL MEDICINE CLINIC | Facility: CLINIC | Age: 65
End: 2020-02-03

## 2020-02-03 NOTE — TELEPHONE ENCOUNTER
Medical records request rec'd from [facility]. OV records from 9-13-19 and 4-26-19 faxed to [phone]. See med rec scan dated 2-3-20.

## 2020-02-24 ENCOUNTER — PATIENT MESSAGE (OUTPATIENT)
Dept: INTERNAL MEDICINE CLINIC | Facility: CLINIC | Age: 65
End: 2020-02-24

## 2020-02-24 DIAGNOSIS — F51.01 PRIMARY INSOMNIA: ICD-10-CM

## 2020-02-26 RX ORDER — ESZOPICLONE 3 MG/1
3 TABLET, FILM COATED ORAL NIGHTLY
Qty: 30 TABLET | Refills: 0 | Status: SHIPPED | OUTPATIENT
Start: 2020-02-26 | End: 2020-05-14 | Stop reason: ALTCHOICE

## 2020-02-26 NOTE — TELEPHONE ENCOUNTER
Last OV relevant to medication: 4/26/19 (last OV was 1/2/2020)  Last refill date: 12/24/2019     #/refills: 30/0  When pt was asked to return for OV: May 2020  Upcoming appt/reason: 5/11/2020 RALF

## 2020-02-26 NOTE — TELEPHONE ENCOUNTER
From: Vanessa Samuel  To: Dea Khanna MD  Sent: 2/24/2020 9:35 PM CST  Subject: Prescription Question    My 2 [facility] pharmacies have requested a refill of generic Lunesta. Please refill to the one on [address] [place] [address]. Thank you.

## 2020-04-20 RX ORDER — POTASSIUM CHLORIDE 20 MEQ/1
40 TABLET, EXTENDED RELEASE ORAL DAILY
Qty: 180 TABLET | Refills: 1 | Status: SHIPPED | OUTPATIENT
Start: 2020-04-20 | End: 2021-03-17

## 2020-04-20 RX ORDER — NABUMETONE 500 MG/1
500 TABLET, FILM COATED ORAL 2 TIMES DAILY
COMMUNITY
Start: 2020-02-18 | End: 2020-12-09

## 2020-04-20 NOTE — TELEPHONE ENCOUNTER
Last OV relevant to medication: 4/26/2019 - AWV, 09/19 - Pre Op, 1/2020 - Smoking   Last refill date: 10/28/2019  #/refills: 180/1  When pt was asked to return for OV: 1 yr or PRN  Upcoming appt/reason: 5/11/2020 - AWV    Lab Results   Component Value Date

## 2020-05-10 ENCOUNTER — PATIENT MESSAGE (OUTPATIENT)
Dept: INTERNAL MEDICINE CLINIC | Facility: CLINIC | Age: 65
End: 2020-05-10

## 2020-05-11 ENCOUNTER — PATIENT MESSAGE (OUTPATIENT)
Dept: INTERNAL MEDICINE CLINIC | Facility: CLINIC | Age: 65
End: 2020-05-11

## 2020-05-11 NOTE — TELEPHONE ENCOUNTER
From: Teri Garay  To: Tino Madsen MD  Sent: 5/10/2020 11:00 PM CDT  Subject: Non-Urgent Medical Question    Everyday, all day I am so tired, exhausted. It is hard to do anything. 1 night I slept 15 hours.  I have much trouble waking/gett

## 2020-05-12 ENCOUNTER — TELEPHONE (OUTPATIENT)
Dept: INTERNAL MEDICINE CLINIC | Facility: CLINIC | Age: 65
End: 2020-05-12

## 2020-05-12 NOTE — TELEPHONE ENCOUNTER
From: Mary Angeles  To: Jennifer Del Castillo MD  Sent: 5/11/2020 2:45 PM CDT  Subject: Non-Urgent Medical Question    I also had vertigo several days ago & off the toilet seat fell 2 times, & hit head on the tub, left-side forehead.  Bled a little

## 2020-05-12 NOTE — TELEPHONE ENCOUNTER
Santos Kuo To  [facility] Clinical Staff Sent  5/11/2020  2:45 PM   I also had vertigo several days ago & off the toilet seat fell 2 times, & hit head on the tub, left-side forehead. Bled a little the first time, badly bruised both times.

## 2020-05-12 NOTE — TELEPHONE ENCOUNTER
Spoke to pt, per symptoms on mychart and below, again advised pt to go to ER. She was hesitant and stated she hates the ER. Advised that she needs to be evaluated and have imaging done on head and chest to evaluate for fracture.   She eventually agreed and

## 2020-05-12 NOTE — TELEPHONE ENCOUNTER
Pt is stating feeling very lethargic, and vertigo has settled down a bit. Pt wondering if her iron is low and maybe even her Vit D may be low, and was hoping to get her Potassium tested too. Pt is wanting those labs ordered.   Pt said that she was told to s

## 2020-05-13 NOTE — TELEPHONE ENCOUNTER
Doximity Video Visit Consent    Epifanio Ashley verbally consents to a Doximity Video Visit Check-In service on 5/14/20  Patient understands that we are using a different platform that may not be as secure as our traditional telehealth platform.  Nolvia

## 2020-05-13 NOTE — TELEPHONE ENCOUNTER
Patient went to [facility] ER this morning. Patient is awaiting to be discharged when she called the office. .  Patient stated that they frances labs on her, CT scan, EKG, chest xray were normal.    Thyroid/iron labs were drawn at ER- those were normal.  Patient is r

## 2020-05-13 NOTE — TELEPHONE ENCOUNTER
Pt went to [facility] ER this morning: Dx fatigue, vertigo, chest wall contusion. See Care Everywhere: Test results available (click refresh)  Pt had not yet been discharged from ER at time of her call this morning, notes may not be completed.   Tests: labs, EKG

## 2020-05-14 ENCOUNTER — TELEMEDICINE (OUTPATIENT)
Dept: INTERNAL MEDICINE CLINIC | Facility: CLINIC | Age: 65
End: 2020-05-14
Payer: MEDICARE

## 2020-05-14 DIAGNOSIS — R53.83 FATIGUE, UNSPECIFIED TYPE: Primary | ICD-10-CM

## 2020-05-14 DIAGNOSIS — F32.A DEPRESSIVE EPISODE: ICD-10-CM

## 2020-05-14 PROCEDURE — 99443 PHONE E/M BY PHYS 21-30 MIN: CPT | Performed by: INTERNAL MEDICINE

## 2020-05-14 NOTE — PROGRESS NOTES
Alisia conducted a telehealth visit with Rodri Doss today, 05/14/20, which was completed using two-way, real-time interactive audio and video communication.  This has been done in good shiva to provide continuity of care in the best interest of the p medications.  Pt takes multiple CNS sedating drugs including clonazapem, high dose Gabapentn, lunesta, paxil,   Baclofen and alflusozin  She does not think her meds are the culprit  No new medications  She c/o exhaustion and is sleeping 15 hours daily, for tablet 0   • ALFUZOSIN HCL ER 10 MG Oral Tablet 24 Hr TAKE 1 TABLET (10 MG TOTAL) BY MOUTH DAILY. 90 tablet 3   • Cholecalciferol (VITAMIN D3) 5000 units Oral Tab Take by mouth daily. • Melatonin 1 MG Oral Cap Take 3 mg by mouth daily.        • acetamin of leg     Closed fracture of one rib of left side with routine healing     Sciatica of left side     Acute cystitis without hematuria     Dysuria     Fibromuscular dysplasia (HCC)     Pneumonia     Rib pain     Sprains and strains of ankle and foot     Ve

## 2020-05-15 ENCOUNTER — TELEPHONE (OUTPATIENT)
Dept: INTERNAL MEDICINE CLINIC | Facility: CLINIC | Age: 65
End: 2020-05-15

## 2020-05-15 NOTE — TELEPHONE ENCOUNTER
I received your navigation order for behavioral health services.  I have reached out to your patient and left a message with my contact information.      I will continue my outreach and update you on the progress.       Thank you,   Aaron Branham   Be

## 2020-05-19 ENCOUNTER — TELEPHONE (OUTPATIENT)
Dept: INTERNAL MEDICINE CLINIC | Facility: CLINIC | Age: 65
End: 2020-05-19

## 2020-05-19 NOTE — TELEPHONE ENCOUNTER
Dr. Savita Cardenas,     Thank you for reaching out to your patient. Freemanpiper Irma spoke with her just now and the following referrals for psychiatry were provided to the patient:     Cathryn Lawrence, Psychiatrist, [facility]

## 2020-05-19 NOTE — TELEPHONE ENCOUNTER
Please reach out to pt  She had a TV w/me last week and she was seemingly depressed. She is on multiple psychoactive medications and I referred her to [facility].  She has not responded, see below:

## 2020-05-19 NOTE — TELEPHONE ENCOUNTER
Spoke to pt. Pt states she is feeling fine, but her furnace went out & she's been busy trying to figure that out. States waiting for repairman to come today. States she got [address] Navigator's messages & has it on her list to call them back today.   She confi

## 2020-05-19 NOTE — TELEPHONE ENCOUNTER
Dr. Vaughn Diss,     I left several messages with my contact information and have not heard back from the patient. In my last message I also provided the [facility] number just in case [phone]. I am closing the order at this time.

## 2020-06-29 ENCOUNTER — TELEPHONE (OUTPATIENT)
Dept: INTERNAL MEDICINE CLINIC | Facility: CLINIC | Age: 65
End: 2020-06-29

## 2020-06-29 NOTE — TELEPHONE ENCOUNTER
Labs reviewed. CBC, folate, vit D, B12 levels are normal.     CMP shows GFR 59, creatinine 1.01. otherwise normal. She can make sure she is hydrating well. Her cholesterol is elevated. , total 225. CVD score is 7.2% due to smoking.  3.8% without s

## 2020-06-29 NOTE — TELEPHONE ENCOUNTER
[facility] [employer] sent with lab result information and recommendations. Pt asked to respond regarding endocrine question  Original lab report sent to scan. Copy in triage pending, to hold temporarily.

## 2020-07-01 ENCOUNTER — MED REC SCAN ONLY (OUTPATIENT)
Dept: INTERNAL MEDICINE CLINIC | Facility: CLINIC | Age: 65
End: 2020-07-01

## 2020-07-03 ENCOUNTER — TELEPHONE (OUTPATIENT)
Dept: INTERNAL MEDICINE CLINIC | Facility: CLINIC | Age: 65
End: 2020-07-03

## 2020-07-03 NOTE — TELEPHONE ENCOUNTER
Incoming (mail or fax): fax  Received from:  Sameer (Auth for release of health information)  Documentation given to:  [facility] med rec bin

## 2020-07-09 NOTE — TELEPHONE ENCOUNTER
Medical records request rec'd from [facility] for latest labs and last year OV notes per Ethan. Forwarded to Scan Stat 7-10-20. See med rec scan dated 7-9-20.

## 2020-07-14 ENCOUNTER — TELEPHONE (OUTPATIENT)
Dept: INTERNAL MEDICINE CLINIC | Facility: CLINIC | Age: 65
End: 2020-07-14

## 2020-07-14 PROBLEM — S22.32XD CLOSED FRACTURE OF ONE RIB OF LEFT SIDE WITH ROUTINE HEALING: Status: RESOLVED | Noted: 2019-04-26 | Resolved: 2020-07-14

## 2020-07-14 PROBLEM — N30.00 ACUTE CYSTITIS WITHOUT HEMATURIA: Status: RESOLVED | Noted: 2017-07-01 | Resolved: 2020-07-14

## 2020-07-14 PROBLEM — R30.0 DYSURIA: Status: RESOLVED | Noted: 2017-07-01 | Resolved: 2020-07-14

## 2020-07-14 PROBLEM — R07.81 RIB PAIN: Status: RESOLVED | Noted: 2017-05-22 | Resolved: 2020-07-14

## 2020-07-14 PROBLEM — M54.32 SCIATICA OF LEFT SIDE: Status: RESOLVED | Noted: 2019-04-26 | Resolved: 2020-07-14

## 2020-07-14 NOTE — TELEPHONE ENCOUNTER
Doximity Video Visit Consent    Celia Yi verbally consents to a Doximity Video Visit Check-In service on 7/14/20  Patient understands that we are using a different platform that may not be as secure as our traditional telehealth platform.  Nolvia

## 2020-07-14 NOTE — TELEPHONE ENCOUNTER
Not appropriate for video  Did video for same c/o in may, hasn't been seen  Needs to come in and needs exended visit

## 2020-07-17 ENCOUNTER — OFFICE VISIT (OUTPATIENT)
Dept: INTERNAL MEDICINE CLINIC | Facility: CLINIC | Age: 65
End: 2020-07-17
Payer: MEDICARE

## 2020-07-17 VITALS
DIASTOLIC BLOOD PRESSURE: 58 MMHG | BODY MASS INDEX: 17.29 KG/M2 | WEIGHT: 127.63 LBS | SYSTOLIC BLOOD PRESSURE: 94 MMHG | RESPIRATION RATE: 12 BRPM | TEMPERATURE: 98 F | HEART RATE: 65 BPM | HEIGHT: 72 IN | OXYGEN SATURATION: 96 %

## 2020-07-17 DIAGNOSIS — Z79.899 POLYPHARMACY: ICD-10-CM

## 2020-07-17 DIAGNOSIS — R53.83 EXHAUSTION: Primary | ICD-10-CM

## 2020-07-17 PROCEDURE — 99214 OFFICE O/P EST MOD 30 MIN: CPT | Performed by: INTERNAL MEDICINE

## 2020-07-17 RX ORDER — AMOXICILLIN AND CLAVULANATE POTASSIUM 875; 125 MG/1; MG/1
1 TABLET, FILM COATED ORAL 2 TIMES DAILY
Qty: 28 TABLET | Refills: 0 | COMMUNITY
Start: 2020-07-17 | End: 2020-09-28 | Stop reason: ALTCHOICE

## 2020-07-17 RX ORDER — FLUCONAZOLE 200 MG/1
1 TABLET ORAL AS NEEDED
COMMUNITY
Start: 2020-04-22 | End: 2021-09-14

## 2020-07-17 NOTE — PROGRESS NOTES
Ngozi Yanez is a 60year old female  Patient presents with:  Fatigue: Sleeping sometimes up to 14 hrs a day and review labs      HPI:   End of January  had flu- severe HA, fever to 102, severe deep cough and congestion , had zpak and was very sick PAROXETINE HCL 40 MG Oral Tab TAKE 0.5 TABLETS (20 MG TOTAL) BY MOUTH 2 (TWO) TIMES DAILY.  30 tablet 11   • Fluticasone Propionate 50 MCG/ACT Nasal Suspension SPRAY 2 SPRAYS INTO EACH NOSTRIL EVERY DAY 3 Bottle 1   • KLOR-CON M20 20 MEQ Oral Tab CR TAKE 2 Tobacco Use      Smoking status: Current Some Day Smoker        Types: Cigarettes        Quit date: 3/11/2020        Years since quittin.3      Smokeless tobacco: Never Used      Tobacco comment: last smoked 2020    Alcohol use:  Yes      Alcohol/wee a large number of results and/or encounters for the requested time period, some results have not been displayed. A complete set of results can be found in Results Review.          ASSESSMENT AND PLAN:   Exhaustion  (primary encounter diagnosis)  Polypharmac

## 2020-07-29 ENCOUNTER — PATIENT MESSAGE (OUTPATIENT)
Dept: INTERNAL MEDICINE CLINIC | Facility: CLINIC | Age: 65
End: 2020-07-29

## 2020-07-30 NOTE — TELEPHONE ENCOUNTER
See pt's MyChart message below. Okay to prescribe Chantix? Or contraindicated with other meds? Pt states is cutting down on clonazepam.  Noted last seen 7/17/2020. Please advise, thanks! Noted in 1/2/2020 OV with Claudia:  1.  Cigarette nicotine depend

## 2020-07-30 NOTE — TELEPHONE ENCOUNTER
From: Faye De Jesus  To: Oscar Kennedy MD  Sent: 7/29/2020 6:25 PM CDT  Subject: Prescription Question    I tried the patch for 5-6 mo. & I am back to 1-2 cig/day. I want to quit so bad. My new boyfriend doesn't smoke.  Please order Maria Eugenia bang

## 2020-08-19 ENCOUNTER — PATIENT MESSAGE (OUTPATIENT)
Dept: INTERNAL MEDICINE CLINIC | Facility: CLINIC | Age: 65
End: 2020-08-19

## 2020-08-20 NOTE — TELEPHONE ENCOUNTER
From: Epifanio Ashley  To: Jhonny Meneses MD  Sent: 8/19/2020 10:13 PM CDT  Subject: Non-Urgent Medical Question    I sleep 14 hrs/nite & wake up tired. Hard to get up. eat breakfast & ready to go back to bed. Tired all day, very low energy.  Illa Schwab

## 2020-08-21 ENCOUNTER — PATIENT MESSAGE (OUTPATIENT)
Dept: INTERNAL MEDICINE CLINIC | Facility: CLINIC | Age: 65
End: 2020-08-21

## 2020-08-24 NOTE — TELEPHONE ENCOUNTER
Pt sent mychart follow up to discussion in 8/19/20 mychart encounter. From: Ngozi Yanez  To: Miguel Pond MD  Sent: 8/21/2020  5:26 PM CDT  Subject: Prescription Question    I have contacted Dr. Angelica Curtis.  Last night I took 1/2 3 mg david

## 2020-09-01 ENCOUNTER — TELEPHONE (OUTPATIENT)
Dept: INTERNAL MEDICINE CLINIC | Facility: CLINIC | Age: 65
End: 2020-09-01

## 2020-09-01 NOTE — TELEPHONE ENCOUNTER
Doximity Video Visit Consent    Dre Grant verbally {consents to a Doximity Video Visit Check-In service on 9/3/2020. Patient understands that we are using a different platform that may not be as secure as our traditional telehealth platform.  Annelise Fuller

## 2020-09-03 ENCOUNTER — TELEMEDICINE (OUTPATIENT)
Dept: INTERNAL MEDICINE CLINIC | Facility: CLINIC | Age: 65
End: 2020-09-03
Payer: MEDICARE

## 2020-09-03 DIAGNOSIS — Z71.89 COUNSELED ABOUT COVID-19 VIRUS INFECTION: ICD-10-CM

## 2020-09-03 DIAGNOSIS — R53.83 FATIGUE, UNSPECIFIED TYPE: Primary | ICD-10-CM

## 2020-09-03 PROCEDURE — 99213 OFFICE O/P EST LOW 20 MIN: CPT | Performed by: NURSE PRACTITIONER

## 2020-09-03 NOTE — PATIENT INSTRUCTIONS
Infection prevention  - proper hand hygiene is important               - cough into your arm or a tissue  - Avoid touching your mouth, nose, eyes, and face.   - Avoid contact with others if you have symptoms of an upper or lower URI  - Avoid contact with ot

## 2020-09-03 NOTE — PROGRESS NOTES
Virtual video check-In    Latoshawilfredo Howards verbally consents to a Virtual/video check-In visit on 09/03/20. Patient has been referred to the [facility] website at [email or web address] to review the yearly Consent to Treat document.     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 coli UTI    • Early satiety    • Elevated CPK    • Environmental allergies    • Epigastric pain    • Excessive menstruation • RLS (restless legs syndrome) 9/10/2008   • Sciatica of left side 4/26/2019   • Sepsis, unspecified ([facility] [address] 2008    pneumonia sepsis   • Sicca [facility] 2011   • Sinus tachycardia    • Sleep apnea    • Sleep apnea, obstructive PSG 11-5-10 Oral TX 6-6-12    AH AST 17 03/03/2019    ALT 18 03/03/2019    BILT 0.6 03/03/2019    TP 7.2 03/03/2019    ALB 3.7 03/03/2019    GLOBULIN 3.5 03/03/2019    AGRATIO 1.8 04/14/2014     03/03/2019    K 4.0 03/03/2019     03/03/2019    CO2 29.0 03/03/2019      Lab Resu utilize the Stayfilm or coronavirus. gov for the most accurate and up-to-date information on COVID-19.  - Recommended self-isolation at home, wearing a mask if in room with other family members.    [email or web address]

## 2020-09-11 ENCOUNTER — TELEPHONE (OUTPATIENT)
Dept: INTERNAL MEDICINE CLINIC | Facility: CLINIC | Age: 65
End: 2020-09-11

## 2020-09-11 DIAGNOSIS — Z13.220 SCREENING FOR CHOLESTEROL LEVEL: Primary | ICD-10-CM

## 2020-09-11 DIAGNOSIS — Z13.220 SCREENING FOR HYPERLIPIDEMIA: ICD-10-CM

## 2020-09-11 NOTE — TELEPHONE ENCOUNTER
Whitley Bridgette form received stating claims for lipid panel and Vit d have been denied by medicare. Requesting addl dx codes and signatures so they can resubmit claim.

## 2020-09-28 ENCOUNTER — OFFICE VISIT (OUTPATIENT)
Dept: INTERNAL MEDICINE CLINIC | Facility: CLINIC | Age: 65
End: 2020-09-28
Payer: MEDICARE

## 2020-09-28 VITALS
DIASTOLIC BLOOD PRESSURE: 66 MMHG | BODY MASS INDEX: 18.12 KG/M2 | HEART RATE: 66 BPM | OXYGEN SATURATION: 97 % | SYSTOLIC BLOOD PRESSURE: 108 MMHG | TEMPERATURE: 99 F | HEIGHT: 71.54 IN | WEIGHT: 132.31 LBS | RESPIRATION RATE: 14 BRPM

## 2020-09-28 DIAGNOSIS — Z00.00 ENCOUNTER FOR ANNUAL HEALTH EXAMINATION: Primary | ICD-10-CM

## 2020-09-28 DIAGNOSIS — Z12.31 BREAST CANCER SCREENING BY MAMMOGRAM: ICD-10-CM

## 2020-09-28 PROCEDURE — G0439 PPPS, SUBSEQ VISIT: HCPCS | Performed by: INTERNAL MEDICINE

## 2020-09-28 RX ORDER — GLUCOSAMINE HCL 500 MG
1 TABLET ORAL DAILY
COMMUNITY

## 2020-09-28 RX ORDER — CLONAZEPAM 2 MG/1
TABLET ORAL
Qty: 105 TABLET | Refills: 0 | COMMUNITY
Start: 2020-09-28 | End: 2021-03-24

## 2020-09-28 NOTE — PROGRESS NOTES
HPI:   Ngozi Yanez is a 60year old female who presents for a Medicare Initial Annual Wellness visit (Once after 12 month Medicare anniversary) . She is a new grandmother, granddaughter [place], in [facility], her youngest son.  Her other son Mikey Dickens do NOT have it on file in [address] [place] [address] Rd.    She has at home, it is her younger son who is an            She currently smokes tobacco.  Social History    Tobacco Use      Smoking status: Current Some Day Smoker        Types: Cigarettes        Quit date: 3/11/ 06/25/2020    CREATSERUM 1.01 06/25/2020    GLU 88 06/25/2020        CBC  (most recent labs)   Lab Results   Component Value Date    WBC 4.6 06/25/2020    HGB 12.5 06/25/2020     06/25/2020        ALLERGIES:   She is allergic to carbamazepine; iron; hemorrhoids (2011), Iron deficiency, Jaw pain, Keratoconjunctivitis sicca not specified as Sjogren's (11/11/2011), Lack of coordination (9/14/2011), Lactic acidosis, Lesion of brain (4/28/2009), Menometrorrhagia, Mental status change, Metabolic acidosis, M history (5/2009); other surgical history (2009); other surgical history (8/1/13); other surgical history (02/2013); other surgical history (3/2017); other surgical history (6/23/17); implant right; implant left (2013); benign biopsy right; benign biopsy le provided to pt                               Diet assessment: excellent     PLAN:  The patient indicates understanding of these issues and agrees to the plan. Reinforced healthy diet, lifestyle, and exercise. No follow-ups on file.      Joye Moon Maintenance if applicable    Chlamydia  Annually if high risk No results found for: CHLAMYDIA No flowsheet data found.     Screening Mammogram      Mammogram Annually to 77, then as discussed Mammogram due on 06/27/2019     [facility] 1.15 (H)    No flowsheet data found. Drug Serum Conc  Annually No results found for: DIGOXIN, DIG, VALP No flowsheet data found.                Template: [employer]IC[employer] ANNUAL ASSESSMENT FEMALE [09827]

## 2020-09-28 NOTE — PATIENT INSTRUCTIONS
Marc Dumont's SCREENING SCHEDULE   Tests on this list are recommended by your physician but may not be covered, or covered at this frequency, by your insurer. Please check with your insurance carrier before scheduling to verify coverage.    PREVEN have smoked more than 100 cigarettes in their lifetime   • Anyone with a family history    Colorectal Cancer Screening  Covered up to Age 76     Colonoscopy Screen   Covered every 10 years- more often if abnormal Colonoscopy due on 09/25/2023 Update Health Influenza  Covered Annually No results found. However, due to the size of the patient record, not all encounters were searched. Please check Results Review for a complete set of results.  Please get every year    Pneumococcal 13 (Prevnar)  Covered Once afte Recommended Websites for Advanced Directives    [email or web address]. org/publications/Documents/personal_dec. pdf  An information packet, including necessary form from the [employer] website. http://[email or web address] [place]state. [place]/public/books/a

## 2020-10-01 ENCOUNTER — MED REC SCAN ONLY (OUTPATIENT)
Dept: INTERNAL MEDICINE CLINIC | Facility: CLINIC | Age: 65
End: 2020-10-01

## 2020-10-05 ENCOUNTER — OFFICE VISIT (OUTPATIENT)
Dept: INTERNAL MEDICINE CLINIC | Facility: CLINIC | Age: 65
End: 2020-10-05
Payer: MEDICARE

## 2020-10-05 VITALS
DIASTOLIC BLOOD PRESSURE: 80 MMHG | SYSTOLIC BLOOD PRESSURE: 120 MMHG | TEMPERATURE: 97 F | BODY MASS INDEX: 17.98 KG/M2 | HEIGHT: 71.54 IN | WEIGHT: 131.31 LBS | HEART RATE: 81 BPM | RESPIRATION RATE: 16 BRPM | OXYGEN SATURATION: 98 %

## 2020-10-05 DIAGNOSIS — Z01.419 ENCOUNTER FOR GYNECOLOGICAL EXAMINATION WITHOUT ABNORMAL FINDING: Primary | ICD-10-CM

## 2020-10-05 DIAGNOSIS — Z12.4 CERVICAL CANCER SCREENING: ICD-10-CM

## 2020-10-05 PROBLEM — I77.3 FIBROMUSCULAR DYSPLASIA (HCC): Status: RESOLVED | Noted: 2018-07-26 | Resolved: 2020-10-05

## 2020-10-05 PROCEDURE — 88175 CYTOPATH C/V AUTO FLUID REDO: CPT | Performed by: INTERNAL MEDICINE

## 2020-10-05 PROCEDURE — 87624 HPV HI-RISK TYP POOLED RSLT: CPT | Performed by: INTERNAL MEDICINE

## 2020-10-05 PROCEDURE — G0101 CA SCREEN;PELVIC/BREAST EXAM: HCPCS | Performed by: INTERNAL MEDICINE

## 2020-10-27 ENCOUNTER — TELEPHONE (OUTPATIENT)
Dept: INTERNAL MEDICINE CLINIC | Facility: CLINIC | Age: 65
End: 2020-10-27

## 2020-10-27 NOTE — TELEPHONE ENCOUNTER
Spoke to Dr. Bernie Zhu and recommended to make a VV. Informed PSR to reach out to pt to schedule a VV.

## 2020-10-27 NOTE — TELEPHONE ENCOUNTER
Received call from pt. Requesting orders for a COVID test.  Pt was seen by Marco Antonio Lucas in September, and an order was placed, but no order was found. Pt was recently exposed by someone who was tested positive. Dr. Jonathon Goodrich- Please advise.

## 2020-10-28 ENCOUNTER — TELEMEDICINE (OUTPATIENT)
Dept: INTERNAL MEDICINE CLINIC | Facility: CLINIC | Age: 65
End: 2020-10-28
Payer: MEDICARE

## 2020-10-28 DIAGNOSIS — Z20.822 EXPOSURE TO COVID-19 VIRUS: ICD-10-CM

## 2020-10-28 DIAGNOSIS — B34.9 ACUTE VIRAL SYNDROME: Primary | ICD-10-CM

## 2020-10-28 PROCEDURE — 99213 OFFICE O/P EST LOW 20 MIN: CPT | Performed by: NURSE PRACTITIONER

## 2020-10-28 NOTE — PROGRESS NOTES
Virtual video [id number] verbally consents to a Virtual/video Check-In visit on 10/28/20. Patient has been referred to the [facility] website at [email or web address] to review the yearly Consent to Treat document.     Patient understands MOUTH 2 (TWO) TIMES DAILY. 30 tablet 11   • KLOR-CON M20 20 MEQ Oral Tab CR TAKE 2 TABLETS (40 MEQ TOTAL) BY MOUTH DAILY. 180 tablet 1   • Nabumetone 500 MG Oral Tab Take 500 mg by mouth 2 (two) times daily.      • baclofen 10 MG Oral Tab TAKE 3 & 1/2 TABLE dysmotility 2011    chronic   • Confusional state    • Constipation     hx chronic constipation   • Contusion of wrist    • Costochondritis    • Cough    • Cramp in limb    • Difficulty sleeping    • DVT prophylaxis    • Dysarthria    • Dyssynergia     typ and hyperpronation   • Pleural effusion    • PND (post-nasal drip)    • Pneumatosis cystoides intestinalis    • Pneumonia    • Reflux    • Rhabdomyolysis 3/2008   • RLS (restless legs syndrome) 9/10/2008   • Sciatica of left side 4/26/2019   • Sepsis, u 06/25/2020    ANIONGAP 7 03/03/2019    GFR 59 06/25/2020    GFRNAA 67 03/03/2019    GFRAA 77 03/03/2019    CA 9.2 03/03/2019    OSMOCALC 295 03/03/2019    ALKPHO 63 03/03/2019    AST 17 03/03/2019    ALT 18 03/03/2019    BILT 0.6 03/03/2019    TP 7.2 03/03 in the plan of care above.

## 2020-10-28 NOTE — PATIENT INSTRUCTIONS
Infection prevention for COVID  - proper hand hygiene is important               - cough into your arm or a tissue  - Avoid touching your mouth, nose, eyes, and face.   - Avoid contact with others if you have symptoms of an upper or lower URI  - Avoid conta You have a viral upper respiratory illness (URI), which is another term for the common cold. This illness is contagious during the first few days. It is spread through the air by coughing and sneezing.  It may also be spread by direct contact (touching the · Over-the-counter cold medicines will not shorten the length of time you’re sick, but they may be helpful for the following symptoms: cough, sore throat, and nasal and sinus congestion.  If you take prescription medicines, ask your healthcare provider or p

## 2020-10-29 ENCOUNTER — APPOINTMENT (OUTPATIENT)
Dept: LAB | Age: 65
End: 2020-10-29
Attending: NURSE PRACTITIONER
Payer: MEDICARE

## 2020-10-29 DIAGNOSIS — B34.9 ACUTE VIRAL SYNDROME: ICD-10-CM

## 2020-10-29 DIAGNOSIS — Z20.822 EXPOSURE TO COVID-19 VIRUS: ICD-10-CM

## 2020-12-22 ENCOUNTER — PATIENT MESSAGE (OUTPATIENT)
Dept: INTERNAL MEDICINE CLINIC | Facility: CLINIC | Age: 65
End: 2020-12-22

## 2020-12-22 DIAGNOSIS — G47.00 INSOMNIA, UNSPECIFIED TYPE: ICD-10-CM

## 2020-12-23 NOTE — TELEPHONE ENCOUNTER
From: Teri Garay  To: Tino Madsen MD  Sent: 12/22/2020 7:13 PM CST  Subject: Prescription Question    I transferred progesterone 200mg x 60 script to [facility] to use [facility] & get a better price. They have contacted you for a refill.  Please

## 2020-12-23 NOTE — TELEPHONE ENCOUNTER
Last refill #60 on 10/26/2020  Last office visit pertaining to refill on 10/5/2020 - gyne exam  Future Appointments   Date Time Provider Majo Strong   12/23/2020 11:00 AM GE ULTRASOUND 1 GEXRAY DMG GE   12/29/2020  4:00 PM Arcadio Gould MD TA G

## 2020-12-28 DIAGNOSIS — G47.00 INSOMNIA, UNSPECIFIED TYPE: ICD-10-CM

## 2021-02-16 ENCOUNTER — PATIENT MESSAGE (OUTPATIENT)
Dept: INTERNAL MEDICINE CLINIC | Facility: CLINIC | Age: 66
End: 2021-02-16

## 2021-02-16 DIAGNOSIS — G47.00 INSOMNIA, UNSPECIFIED TYPE: ICD-10-CM

## 2021-02-17 NOTE — TELEPHONE ENCOUNTER
From: Lary Moncada  To:  Justice Glynn MD  Sent: 2/16/2021 7:49 PM CST  Subject: Prescription Question    I am sorry to make you go through this, but I need to change pharmacy's to save $. Please place an order for 60 generic progesterone, 2

## 2021-02-17 NOTE — TELEPHONE ENCOUNTER
Refill request for progesterone. Reviewed chart. First prescribed 10/26/20 as #60 after pt request (10/22/20 mychart). Refilled as #60 12/22/20. Not sure if she is to remain at #60 or should be switched to #90. 90 pended with addl refills.  Noted 10/26/20 D

## 2021-03-03 ENCOUNTER — PATIENT MESSAGE (OUTPATIENT)
Dept: INTERNAL MEDICINE CLINIC | Facility: CLINIC | Age: 66
End: 2021-03-03

## 2021-03-05 ENCOUNTER — OFFICE VISIT (OUTPATIENT)
Dept: INTERNAL MEDICINE CLINIC | Facility: CLINIC | Age: 66
End: 2021-03-05
Payer: MEDICARE

## 2021-03-05 VITALS
SYSTOLIC BLOOD PRESSURE: 116 MMHG | OXYGEN SATURATION: 95 % | DIASTOLIC BLOOD PRESSURE: 74 MMHG | HEIGHT: 71.54 IN | RESPIRATION RATE: 14 BRPM | HEART RATE: 78 BPM | BODY MASS INDEX: 18.8 KG/M2 | WEIGHT: 137.31 LBS | TEMPERATURE: 98 F

## 2021-03-05 DIAGNOSIS — N76.2 ACUTE VULVITIS: Primary | ICD-10-CM

## 2021-03-05 PROCEDURE — 87660 TRICHOMONAS VAGIN DIR PROBE: CPT | Performed by: INTERNAL MEDICINE

## 2021-03-05 PROCEDURE — 99213 OFFICE O/P EST LOW 20 MIN: CPT | Performed by: INTERNAL MEDICINE

## 2021-03-05 PROCEDURE — 87510 GARDNER VAG DNA DIR PROBE: CPT | Performed by: INTERNAL MEDICINE

## 2021-03-05 PROCEDURE — 87480 CANDIDA DNA DIR PROBE: CPT | Performed by: INTERNAL MEDICINE

## 2021-03-05 NOTE — PROGRESS NOTES
Mallory Johnson is a 60year old female  Patient presents with:  Vaginal Problem: Itchy and Raw feeling - 1 week      HPI:   Itchy raw vulva  Using:used 2 diflucan 2 days in a row a week ago, always has to treat this way.    Improved and started itching acetaminophen 500 MG Oral Tab Take 1,000 mg by mouth every 6 (six) hours as needed for Pain. • Saline 0.2 % Nasal Solution by Nasal route nightly.        • Glucosamine-Chondroit-Vit C-Mn (GLUCOSAMINE CHONDR 1500 COMPLX) Oral Cap Take 1 tablet by mouth PLAN:   Acute vulvitis  (primary encounter diagnosis)partially treated, exam noncontrib, do Affirm, see gyne if negative    No orders of the defined types were placed in this encounter.       Meds & Refills for this Visit:  Requested Prescriptions      No p

## 2021-03-08 ENCOUNTER — PATIENT MESSAGE (OUTPATIENT)
Dept: INTERNAL MEDICINE CLINIC | Facility: CLINIC | Age: 66
End: 2021-03-08

## 2021-03-08 DIAGNOSIS — F32.A DEPRESSION, UNSPECIFIED DEPRESSION TYPE: ICD-10-CM

## 2021-03-08 DIAGNOSIS — F32.A DEPRESSIVE EPISODE: Primary | ICD-10-CM

## 2021-03-09 NOTE — TELEPHONE ENCOUNTER
From: Mary Angeles  To: Jennifer Del Castillo MD  Sent: 3/8/2021 9:39 PM CST  Subject: Non-Urgent Medical Question    Will you recommend a psychologist for me, near [place]. I am lonely & sad without a partner.

## 2021-03-14 ENCOUNTER — PATIENT MESSAGE (OUTPATIENT)
Dept: INTERNAL MEDICINE CLINIC | Facility: CLINIC | Age: 66
End: 2021-03-14

## 2021-03-15 NOTE — TELEPHONE ENCOUNTER
Lis,     I received your navigation order for behavioral health services.  I have reached out to your patient and left a message with my contact information.      I will continue my outreach and update you on the progress.         Thanks,     Rose Marie Perez, JOCELYN

## 2021-03-15 NOTE — TELEPHONE ENCOUNTER
From: Jadon Tejeda  To: Carmella Paz MD  Sent: 3/14/2021 8:25 PM CDT  Subject: Non-Urgent Medical Question    How/where do I get a covid vacine. I am 66 on 05/09.

## 2021-03-17 RX ORDER — HYDROCODONE BITARTRATE AND ACETAMINOPHEN 5; 325 MG/1; MG/1
1 TABLET ORAL 2 TIMES DAILY PRN
COMMUNITY
Start: 2021-03-08 | End: 2021-04-20 | Stop reason: ALTCHOICE

## 2021-03-17 RX ORDER — AMOXICILLIN 500 MG/1
500 CAPSULE ORAL 3 TIMES DAILY
COMMUNITY
Start: 2021-03-08 | End: 2021-04-20 | Stop reason: ALTCHOICE

## 2021-03-17 RX ORDER — POTASSIUM CHLORIDE 20 MEQ/1
TABLET, EXTENDED RELEASE ORAL
Qty: 180 TABLET | Refills: 1 | Status: SHIPPED | OUTPATIENT
Start: 2021-03-17 | End: 2021-08-20

## 2021-03-17 RX ORDER — ACETAMINOPHEN AND CODEINE PHOSPHATE 300; 30 MG/1; MG/1
1 TABLET ORAL EVERY 6 HOURS PRN
COMMUNITY
Start: 2021-03-06 | End: 2021-04-20 | Stop reason: ALTCHOICE

## 2021-03-17 NOTE — TELEPHONE ENCOUNTER
Destin Hays,     Thanks for sending the patient a message, after you sent that, she called me back and left me a voice message.  I tried reaching out to her this morning, however she did not answer and I noticed each time I call, it goes straight to voicemail.

## 2021-03-17 NOTE — TELEPHONE ENCOUNTER
Pt states she's been taking medication - \"maybe not everyday\" pt stated.     Last OV relevant to medication: 10/5/2020 - Breast & Pelvic Exam  Last refill date: 4/20/2020     #/refills: 180/0  When pt was asked to return for OV: None Noted  Upcoming appt/

## 2021-03-17 NOTE — TELEPHONE ENCOUNTER
Destin Hays,     On 3/17/2021, the following referrals for therapy were provided to the patient:     Ayad Askew, [address] [place] [address]     The patient plans to call to schedule

## 2021-04-20 ENCOUNTER — OFFICE VISIT (OUTPATIENT)
Dept: INTERNAL MEDICINE CLINIC | Facility: CLINIC | Age: 66
End: 2021-04-20
Payer: MEDICARE

## 2021-04-20 VITALS
DIASTOLIC BLOOD PRESSURE: 72 MMHG | OXYGEN SATURATION: 98 % | TEMPERATURE: 97 F | BODY MASS INDEX: 18.74 KG/M2 | SYSTOLIC BLOOD PRESSURE: 116 MMHG | HEART RATE: 96 BPM | WEIGHT: 136.81 LBS | RESPIRATION RATE: 14 BRPM | HEIGHT: 71.54 IN

## 2021-04-20 DIAGNOSIS — G89.29 OTHER CHRONIC PAIN: Primary | ICD-10-CM

## 2021-04-20 DIAGNOSIS — Z79.899 POLYPHARMACY: ICD-10-CM

## 2021-04-20 DIAGNOSIS — G47.9 SLEEP DISORDER: ICD-10-CM

## 2021-04-20 PROCEDURE — 99213 OFFICE O/P EST LOW 20 MIN: CPT | Performed by: INTERNAL MEDICINE

## 2021-04-20 RX ORDER — LEVOTHYROXINE SODIUM 88 UG/1
88 TABLET ORAL DAILY
COMMUNITY
Start: 2021-03-18

## 2021-04-20 NOTE — PROGRESS NOTES
Dariel Moura is a 60year old female  Patient presents with:  Medication Follow-Up  Medication Request: Medication for Pain for Upcoming Dental Procedures      HPI:   \"my mouth is a disaster d/t dry mouth\"  She needs extensive dental work and brin Cholecalciferol (VITAMIN D3) 75 MCG (3000 UT) Oral Tab Take 1 tablet by mouth daily.      • FLUTICASONE PROPIONATE 50 MCG/ACT Nasal Suspension SPRAY 2 SPRAYS INTO EACH NOSTRIL EVERY DAY 3 Bottle 1   • triamcinolone acetonide 0.1 % External Ointment Use on A Vitamin D deficiency     Osteopenia     Thyroid nodule     Primary insomnia     Varicose vein of leg     Vertigo     Constipation     Pain in bone fixation device (HCC)     Trigeminal neuropathy        REVIEW OF SYSTEMS:   GENERAL HEALTH: feels well otherw and documentation of all of the above. No orders of the defined types were placed in this encounter.       Meds & Refills for this Visit:  Requested Prescriptions      No prescriptions requested or ordered in this encounter       Imaging & Consults:  O

## 2021-05-03 ENCOUNTER — PATIENT MESSAGE (OUTPATIENT)
Dept: INTERNAL MEDICINE CLINIC | Facility: CLINIC | Age: 66
End: 2021-05-03

## 2021-05-04 ENCOUNTER — PATIENT MESSAGE (OUTPATIENT)
Dept: INTERNAL MEDICINE CLINIC | Facility: CLINIC | Age: 66
End: 2021-05-04

## 2021-05-04 NOTE — TELEPHONE ENCOUNTER
Informed pt, replied on MyChart. Pt does not have Norco yet. Pt is asking for script.     FYI to Dr. Stella Light

## 2021-05-04 NOTE — TELEPHONE ENCOUNTER
Dentist will have to prescribe, but usually after taking care of the emergency pain medication not needed except for very short term pre-op.   Ibuprofen 600 mg TID w/food has been found to be as effective as opiates after dental surgery  She is on 5 or 6 hi

## 2021-05-04 NOTE — TELEPHONE ENCOUNTER
From: Kait White  To:  Omi Abel MD  Sent: 5/4/2021 1:24 AM CDT  Subject: Prescription Question    I think 1 hydrocodone/acet 5/325 & 2 tylenol with codeine/day would work also

## 2021-05-04 NOTE — TELEPHONE ENCOUNTER
Dr. Acosta Dawson only  See patient message below    Called to triage    Spoke with patient and advised to contact pain clinic if her pain is 10/10   Patient stated she can not get in sooner  Advised to go to ER if pain is not tolerable as It is not in Dr. Juan Jacobos

## 2021-05-04 NOTE — TELEPHONE ENCOUNTER
From: Lisa Salas  To: Tahmina Montoya MD  Sent: 5/3/2021 9:41 PM CDT  Subject: Prescription Question    MY TOOTH PAIN IS EXCRUCIATING. I CAN'T STAND IT & CAN'T GET INTO PAIN CLINIC TILL 5/12.  Only thing that helps is Hydrodcodone/Acet 5/ 32

## 2021-05-04 NOTE — TELEPHONE ENCOUNTER
Where did she get the norco?  She needs to see the dentist and have the tooth in question taken care of, this is a dental emergency

## 2021-05-09 ENCOUNTER — PATIENT MESSAGE (OUTPATIENT)
Dept: INTERNAL MEDICINE CLINIC | Facility: CLINIC | Age: 66
End: 2021-05-09

## 2021-05-10 NOTE — TELEPHONE ENCOUNTER
From: Brett Roca  To: Jacob Manuel MD  Sent: 5/9/2021 9:16 PM CDT  Subject: Prescription Question    I had 3 Lunesta left & tried 1, 3mg @ hs & it helped me sleep.  Will you write a script for 3mg Lunesta for me

## 2021-05-11 NOTE — TELEPHONE ENCOUNTER
She would have to obtain that from her neurologist as on 8 mg of clonaz as well as many other CNS drugs

## 2021-05-12 ENCOUNTER — OFFICE VISIT (OUTPATIENT)
Dept: PAIN CLINIC | Facility: CLINIC | Age: 66
End: 2021-05-12
Payer: MEDICARE

## 2021-05-12 VITALS
SYSTOLIC BLOOD PRESSURE: 96 MMHG | BODY MASS INDEX: 19 KG/M2 | WEIGHT: 136 LBS | HEART RATE: 87 BPM | OXYGEN SATURATION: 95 % | DIASTOLIC BLOOD PRESSURE: 64 MMHG

## 2021-05-12 DIAGNOSIS — G89.29 CHRONIC FACIAL PAIN: Primary | ICD-10-CM

## 2021-05-12 DIAGNOSIS — R51.9 CHRONIC FACIAL PAIN: Primary | ICD-10-CM

## 2021-05-12 PROCEDURE — 99204 OFFICE O/P NEW MOD 45 MIN: CPT | Performed by: ANESTHESIOLOGY

## 2021-05-12 RX ORDER — PROGESTERONE 200 MG/1
CAPSULE ORAL
COMMUNITY
Start: 2020-12-23 | End: 2021-09-07 | Stop reason: ALTCHOICE

## 2021-05-12 NOTE — H&P
Name: Shreya Mayorga   : 1955   DOS: 2021     Chief complaint: Facial and tooth pain    History of present illness:  Shreya Mayorga is a 73year old female who presents today for evaluation of pain in her teeth.   The patient reports a interstitial cystitis   • Chronic LBP    • Closed fracture of one rib of left side with routine healing 4/26/2019   • Colonic dysmotility 2011    chronic   • Confusional state    • Constipation     hx chronic constipation   • Contusion of wrist    • Costoc hx: osteopenia   • Osteopenia 7/9/2015   • Palpitations     benign   • Paresthesia    • Peripheral neuropathy    • Pes planus     and hyperpronation   • Pleural effusion    • PND (post-nasal drip)    • Pneumatosis cystoides intestinalis    • Pneumonia    • TAKE 3 AND 1/2 TABLET BY MOUTH TWO TIMES A DAY AND 1 TABLET EVERY NIGHT AT BEDTIME 240 tablet 3   • Cholecalciferol (VITAMIN D3) 75 MCG (3000 UT) Oral Tab Take 1 tablet by mouth daily.      • FLUTICASONE PROPIONATE 50 MCG/ACT Nasal Suspension SPRAY 2 SPRAYS HERNIA SURGERY  2013    hernia repair   • IMPLANT LEFT     • IMPLANT RIGHT     •   1885,    • OTHER  2014    colposcopy with Dr. Zahida Julio   • OTHER SURGICAL HISTORY  3/16/09  ASC    EGD (epig pain, early satiety): Normal   • OTHER S Disease) Father    • Dementia Father    • Other Father         alzheimers   • Neurological Disorder Mother         ALS   • Other (Kristie Milian) Mother    • Other Mother    • Gastro-Intestinal Disorder Son         Chronic diarrhea   • Other (Sigmoid v 5    5    Radiology diagnostic studies:   CT of her brain reviewed. There is evidence of chronic small vessel ischemic change    Assessment:  Chronic facial pain  (primary encounter diagnosis).       Plan:     The patient is a 70-year-old female who presen medications from her treating dentist.    Ortega Vaughan MD  Pain Management

## 2021-05-12 NOTE — PROGRESS NOTES
HPI/Subjective:   Patient ID: Dariel Moura is a 73year old female.     HPI    History/Other:   Review of Systems  Current Outpatient Medications   Medication Sig Dispense Refill   • Progesterone 200 MG Oral Cap Take 1 capsule (200 mg total) by mouth as needed for Pain. • Saline 0.2 % Nasal Solution by Nasal route nightly. • Glucosamine-Chondroit-Vit C-Mn (GLUCOSAMINE CHONDR 1500 COMPLX) Oral Cap Take 1 tablet by mouth daily.      • DAILY MULTIVITAMIN OR 1 po qd       Allergies:  Andre Marino

## 2021-05-26 ENCOUNTER — TELEPHONE (OUTPATIENT)
Dept: INTERNAL MEDICINE CLINIC | Facility: CLINIC | Age: 66
End: 2021-05-26

## 2021-05-26 NOTE — TELEPHONE ENCOUNTER
LMTCB to find out more information re: ED visit. Received notification that pt arrived at ER at [facility]. Unable to update CareEverywhere. Authorization required to be collected.   DEWEY request faxed to [facility] Medical Records for ED r

## 2021-06-03 NOTE — TELEPHONE ENCOUNTER
Incoming (mail or fax):  fax  Received from:  [facility]   Documentation given to:  Triage Incoming bin

## 2021-06-03 NOTE — TELEPHONE ENCOUNTER
Incoming (mail or fax): fax  Received from: [facility]  Documentation given to: triage    ER records from 5/26/21. Noted pt currently admitted for GI bleed. Unable to link CareEverywhere d/t requiring Patient Authorization.   To follow-up on discharge & HFU.

## 2021-06-09 NOTE — TELEPHONE ENCOUNTER
PSR called pt to schedule non-[facility] hospital follow up. Directly sent to [facility] for pt to call back the office. FYI.

## 2021-06-09 NOTE — TELEPHONE ENCOUNTER
PSR- Please call pt to schedule for non tcm HFU  Patient was admitted to [facility] from 5/26/21 to 5/28/21 for GI bleed  Discharge summary in care everywhere

## 2021-08-20 NOTE — TELEPHONE ENCOUNTER
Last OV relevant to medication: 10/5/2020; more recently 4/20/21  Last refill date: 3/17/21     #/refills: 180/1  When pt was asked to return for OV: none noted; per HM, PE due 9/28/21  Upcoming appt/reason: none  Was pt informed of any over due labs: none

## 2021-08-21 RX ORDER — POTASSIUM CHLORIDE 20 MEQ/1
TABLET, EXTENDED RELEASE ORAL
Qty: 180 TABLET | Refills: 0 | Status: SHIPPED | OUTPATIENT
Start: 2021-08-21

## 2021-08-31 ENCOUNTER — PATIENT OUTREACH (OUTPATIENT)
Dept: CASE MANAGEMENT | Age: 66
End: 2021-08-31

## 2021-08-31 ENCOUNTER — TELEPHONE (OUTPATIENT)
Dept: INTERNAL MEDICINE CLINIC | Facility: CLINIC | Age: 66
End: 2021-08-31

## 2021-08-31 DIAGNOSIS — Z09 HOSPITAL DISCHARGE FOLLOW-UP: Primary | ICD-10-CM

## 2021-08-31 DIAGNOSIS — Z76.89 ENCOUNTER FOR SUPPORT AND COORDINATION OF TRANSITION OF CARE: ICD-10-CM

## 2021-08-31 NOTE — PROGRESS NOTES
Pt dc'd from [facility] on 8/27 due to a fall. LMTCB for post hospital follow up, NCM contact information provided.

## 2021-08-31 NOTE — PROGRESS NOTES
[employer] message sent to the pt for TCM. NCM contact information was included in message.

## 2021-08-31 NOTE — TELEPHONE ENCOUNTER
Received call from [facility], nurse for [facility]. Reporting as an FYI, HH was suppose to start today, but no answer at pt's home, pt's phone, sister's phone or son's phone. Noted pt was admitted 8/25/21 to [facility] & discharged 8/27/21.   Noted also a TE

## 2021-09-02 ENCOUNTER — TELEPHONE (OUTPATIENT)
Dept: INTERNAL MEDICINE CLINIC | Facility: CLINIC | Age: 66
End: 2021-09-02

## 2021-09-02 NOTE — TELEPHONE ENCOUNTER
Spoke with home health nurse   Raffaele Loving Nurse from [address] [facility]) [phone] her of Dr Bella George notes, pt to go to ER immediately, by ambulance if no transportation. Nurse does not have 02 tank.  Nurse did reach out to pulmonolog

## 2021-09-02 NOTE — TELEPHONE ENCOUNTER
PSR been trying to reach pt and phone goes straight to VM every time.  Patient HIPPA states cannot give routine or sensitive information to anyone listed

## 2021-09-02 NOTE — TELEPHONE ENCOUNTER
[facility] Physical Therapist gave a call to notify Dr. Shabana Acevedo on some updates. Pt stated that they had visited the pt at their home. Pt has an oxygen level of 94%, remarking that this is better than the previous day. Physical therapist would like for Dr. Shabana Acevedo to sign the home health order upon arrival. Please advise, thank you.

## 2021-09-02 NOTE — TELEPHONE ENCOUNTER
I cannot approve home O2 without a pulmonary assessment- the nurse will have to call 911, she can have O2 emergently of course    She is overmedicated and I suspect it is from respiratory depression

## 2021-09-02 NOTE — TELEPHONE ENCOUNTER
Spoke to pt. Pt is A/Ox3, speech is slow. C/o dizziness, weakness, & low energy. Denies any chest pain, SOB, nausea or vomiting. Pt states she is drinking plenty of fluids. C/o rib pain still from fall last week just prior to hospitalization.   Pt disc

## 2021-09-02 NOTE — TELEPHONE ENCOUNTER
Spoke with Janet Cid 227  Stated when she assessed pt yesterday for [facility] admission, noted her O2 was staying in 88-89 unless pt is taking a deep breath  Stated other vitals were ok  Pt still c/o dizzyness  RN advised pt to go to ER for low oxygen, pt refus

## 2021-09-03 ENCOUNTER — TELEPHONE (OUTPATIENT)
Dept: INTERNAL MEDICINE CLINIC | Facility: CLINIC | Age: 66
End: 2021-09-03

## 2021-09-03 PROBLEM — R10.84 DIFFUSE ABDOMINAL PAIN: Status: ACTIVE | Noted: 2021-05-26

## 2021-09-03 PROBLEM — K92.2 GASTROINTESTINAL HEMORRHAGE: Status: ACTIVE | Noted: 2021-05-26

## 2021-09-03 NOTE — TELEPHONE ENCOUNTER
Alva Pulido was calling to verify that Dr Beatrice Gan is going to be following with Pt during [facility] and will sign off on orders.    Thank you

## 2021-09-03 NOTE — TELEPHONE ENCOUNTER
Oren Brown, a nurse from [place] [address], and stated that while they checked on a pt they noticed the pt had a oxygen saturation of 88% to 90%. Oren Brown stated that the pt wants oxygen at home and she agrees with her.  Oren Brown states the pt is prone to dizzin

## 2021-09-07 NOTE — TELEPHONE ENCOUNTER
This has been addressed with home health and pt - see earlier encounter. Pt had been strongly urged repeatedly to go to ER, to call 911 if needed. Pt admitted to [facility] 9/3/21, discharged 9/6/21. TCM ordered. Needs TCM HFU.

## 2021-09-07 NOTE — TELEPHONE ENCOUNTER
Spoke with Lachelle Magallon pt had 2nd hospitalization over weekend, was discharged yesterday. Requires HFU first. They will reach out to pt. TCM already requested in  encounter.

## 2021-09-08 ENCOUNTER — PATIENT OUTREACH (OUTPATIENT)
Dept: CASE MANAGEMENT | Age: 66
End: 2021-09-08

## 2021-09-08 ENCOUNTER — TELEPHONE (OUTPATIENT)
Dept: INTERNAL MEDICINE CLINIC | Facility: CLINIC | Age: 66
End: 2021-09-08

## 2021-09-08 DIAGNOSIS — Z02.9 ENCOUNTERS FOR UNSPECIFIED ADMINISTRATIVE PURPOSE: ICD-10-CM

## 2021-09-08 NOTE — TELEPHONE ENCOUNTER
Patient scheduled a HFU virtual visit for September 14th with Claudia. She refused a sooner appt with Dr Belinda Vaca because she needed an appt later in the day.   When asked if she's done a video visit before, she said she has been sent a link in the past.    Terrance Hart

## 2021-09-08 NOTE — TELEPHONE ENCOUNTER
PSR - Please call pt or pt's sister Zulema Holt to schedule TCM HFU. By 9/13/21; or very latest by 9/20/21. Thanks! Hopefully tomorrow or Friday with Dr. Jose Raul Villalobos before she is out of office. Noted TCM referral order was placed yesterday.  See 9/3/21 TE.

## 2021-09-08 NOTE — TELEPHONE ENCOUNTER
Incoming (mail or fax):  fax  Received from:  [address] [place] [address]   Documentation given to:  Triage in basket    Needs to be signed and returned

## 2021-09-08 NOTE — TELEPHONE ENCOUNTER
Incoming (mail or fax):  fax  Received from: [address] [place] [address]   Documentation given to:  Triage in basket     Needs to be signed and returned

## 2021-09-08 NOTE — TELEPHONE ENCOUNTER
Order Number: [id number]    Received SCHEDULE MANOJ VISIT type of order.   MANOJ for 9/8/21 for RN/PT/OT SERVICES    SN EFFECTIVE 9/5/21 1WK1  PT EFFECTIVE 9/5/21 1WK1  OT EFFECTIVE 9/5/21 1WK1    To Dr. Idania Palmer for signature  To fax back to [phone]

## 2021-09-08 NOTE — TELEPHONE ENCOUNTER
See Claudia's note below. Noted future appt 10/5/21 with Internal Med, Dr. Heddy Schlatter. PSR - Please confirm if pt will still follow with us or is transferring? Thanks!

## 2021-09-08 NOTE — TELEPHONE ENCOUNTER
Received Order Number: [id number]  y 264, Ana Maria Marker 388 of Care  Period 9/1/21 to 10/30/21    To Claudia for signatures    To fax back to [phone]    See other TE, to schedule pt for TCM HFU

## 2021-09-09 ENCOUNTER — TELEPHONE (OUTPATIENT)
Dept: INTERNAL MEDICINE CLINIC | Facility: CLINIC | Age: 66
End: 2021-09-09

## 2021-09-09 NOTE — TELEPHONE ENCOUNTER
Home health nurse calling and wanted to let Carlton Oliver know that they resumed care with pt yesterday and that there is an interaction with her medications potassium and oral bactrim.  If any questions please call [phone]    Thank you

## 2021-09-09 NOTE — TELEPHONE ENCOUNTER
Noted Potassium level 4.0 on 9/6/21  Spoke with [facility] [place] nurse and advised to contact pt's urologist Dr. Brennen Muir if have further questions regarding interaction since it was prescribed by Urologist  RN v/u

## 2021-09-09 NOTE — TELEPHONE ENCOUNTER
Incoming (mail or fax):   Fax  Received from:  [address] [facility]  Documentation given to:  Triage incoming bin      Notice of Missed Visit, [address]- Physician Signature

## 2021-09-10 ENCOUNTER — TELEPHONE (OUTPATIENT)
Dept: INTERNAL MEDICINE CLINIC | Facility: CLINIC | Age: 66
End: 2021-09-10

## 2021-09-10 NOTE — TELEPHONE ENCOUNTER
Incoming (mail or fax):  fax  Received from:  [address] [place] [address]   Documentation given to:  Triage in basket     Needs to be signed and returned

## 2021-09-11 ENCOUNTER — TELEPHONE (OUTPATIENT)
Dept: INTERNAL MEDICINE CLINIC | Facility: CLINIC | Age: 66
End: 2021-09-11

## 2021-09-11 NOTE — TELEPHONE ENCOUNTER
Incoming (mail or fax):  fax  Received from:  [address] [facility]  Documentation given to:  Triage incoming bin    Home health orders for signature  Certification period 9/1/21-10/30/21

## 2021-09-13 ENCOUNTER — TELEPHONE (OUTPATIENT)
Dept: INTERNAL MEDICINE CLINIC | Facility: CLINIC | Age: 66
End: 2021-09-13

## 2021-09-13 NOTE — TELEPHONE ENCOUNTER
Received Order Number: [id number]    Schedule MANOJ Visit    To [employer] Brochure for review & signature

## 2021-09-13 NOTE — TELEPHONE ENCOUNTER
See other TE re: Home Health Certification.     Received Order Number [id number]    Resumption of Care    To Claudia for review & signatures

## 2021-09-13 NOTE — TELEPHONE ENCOUNTER
Received Order Number: [id number]  Physician Order  OT Initial Eval - Missed Visit  Pt declined OT services at this time

## 2021-09-13 NOTE — TELEPHONE ENCOUNTER
Krista Benton would like confirm that Dr Taylor Soriano will be signing orders for Pt's care  Please advise  Thank you

## 2021-09-14 ENCOUNTER — TELEMEDICINE (OUTPATIENT)
Dept: INTERNAL MEDICINE CLINIC | Facility: CLINIC | Age: 66
End: 2021-09-14

## 2021-09-14 VITALS — BODY MASS INDEX: 19 KG/M2 | HEIGHT: 70 IN

## 2021-09-14 DIAGNOSIS — M79.7 FIBROMYALGIA: ICD-10-CM

## 2021-09-14 DIAGNOSIS — R42 VERTIGO: ICD-10-CM

## 2021-09-14 DIAGNOSIS — Z13.220 SCREENING FOR CHOLESTEROL LEVEL: ICD-10-CM

## 2021-09-14 DIAGNOSIS — G50.9 TRIGEMINAL NEUROPATHY: ICD-10-CM

## 2021-09-14 DIAGNOSIS — R06.00 DOE (DYSPNEA ON EXERTION): ICD-10-CM

## 2021-09-14 DIAGNOSIS — G43.909 MIGRAINE WITHOUT STATUS MIGRAINOSUS, NOT INTRACTABLE, UNSPECIFIED MIGRAINE TYPE: ICD-10-CM

## 2021-09-14 DIAGNOSIS — I77.3 FIBROMUSCULAR DYSPLASIA OF CAROTID ARTERY (HCC): ICD-10-CM

## 2021-09-14 DIAGNOSIS — I82.451 ACUTE DEEP VEIN THROMBOSIS (DVT) OF RIGHT PERONEAL VEIN (HCC): ICD-10-CM

## 2021-09-14 DIAGNOSIS — N18.31 STAGE 3A CHRONIC KIDNEY DISEASE (HCC): ICD-10-CM

## 2021-09-14 DIAGNOSIS — E55.9 VITAMIN D DEFICIENCY: ICD-10-CM

## 2021-09-14 DIAGNOSIS — Z09 HOSPITAL DISCHARGE FOLLOW-UP: Primary | ICD-10-CM

## 2021-09-14 DIAGNOSIS — Z13.29 SCREENING FOR THYROID DISORDER: ICD-10-CM

## 2021-09-14 DIAGNOSIS — I83.93 VARICOSE VEINS OF BOTH LOWER EXTREMITIES, UNSPECIFIED WHETHER COMPLICATED: ICD-10-CM

## 2021-09-14 DIAGNOSIS — I26.99 ACUTE PULMONARY EMBOLISM WITHOUT ACUTE COR PULMONALE, UNSPECIFIED PULMONARY EMBOLISM TYPE (HCC): ICD-10-CM

## 2021-09-14 PROBLEM — R06.09 DOE (DYSPNEA ON EXERTION): Status: ACTIVE | Noted: 2021-09-11

## 2021-09-14 PROCEDURE — 99495 TRANSJ CARE MGMT MOD F2F 14D: CPT | Performed by: NURSE PRACTITIONER

## 2021-09-14 RX ORDER — HYDROCODONE BITARTRATE AND ACETAMINOPHEN 10; 325 MG/1; MG/1
1 TABLET ORAL
COMMUNITY
Start: 2021-07-14 | End: 2021-10-05

## 2021-09-14 NOTE — TELEPHONE ENCOUNTER
Pt saw Gregory Ruiz today for HFU VV. All HH orders signed by Aleksandra Gear all documents to Monika Banks at [phone]  Confirmed faxes sent.     Order #s: [id number], [id number], [id number], [id number], [id number]    Sent originals to scanning  Copies made in folder with Triage, to jan

## 2021-09-14 NOTE — PROGRESS NOTES
Virtual video [id number] verbally consents to a Virtual/video Check-In visit on 09/14/21. Patient has been referred to the [facility] website at [email or web address] to review the yearly Consent to Treat document.     Patient understands around 100/60. Sees neuro and pain service. Is on a lot of medications that can cause sedating effects. Lives alone. HHC is ordered.      Allergies:  She is allergic to carbamazepine, iron, pneumovax [pneumococcal polysaccharides], tramadol, ultram Gm Fernández every 6 (six) hours as needed for Pain. Saline 0.2 % Nasal Solution, by Nasal route nightly. Glucosamine-Chondroit-Vit C-Mn (GLUCOSAMINE CHONDR 1500 COMPLX) Oral Cap, Take 1 tablet by mouth daily.   DAILY MULTIVITAMIN OR, 1 po qd  HYDROcodone-acetamin Internal hemorrhoids (2011), Iron deficiency, Jaw pain, Keratoconjunctivitis sicca not specified as Sjogren's (11/11/2011), Lack of coordination (9/14/2011), Lactic acidosis, Lesion of brain (4/28/2009), Menometrorrhagia, Mental status change, Metabolic ac history (5/2009); other surgical history (2009); other surgical history (8/1/13); other surgical history (02/2013); other surgical history (3/2017); other surgical history (6/23/17); implant right; implant left (2013); benign biopsy right; benign biopsy le Appearance: Normal appearance. HENT:      Head: Normocephalic and atraumatic. Pulmonary:      Effort: Pulmonary effort is normal. No respiratory distress. Neurological:      Mental Status: She is alert and oriented to person, place, and time.    Psych fracture or dislocation identified. Chronic appearing deformity of the fifth metatarsal shaft. Mild left first MTP joint degenerative changes. No discrete radiopaque foreign body identified.  IMPRESSION: No acute osseous abnormality identified If there is c WO CONTRAST. History: dizziness, falls, [place]. Procedure: MRI of the brain was performed with attention to the internal auditory canals.  The following sequences of the brain were acquired: Sagittal and axial T1-weighted, axial T2-weighted, axial SWAN, axial F acute intracranial abnormality or abnormal enhancement. 2. No mass or pathological enhancement in the internal auditory canals, trigeminal nerves, or cerebellopontine angles.  3. Looping vessel entering the right porus acusticus and IAC without definitive n Evaluation of the internal auditory canals, trigeminal nerves, and cerebellopontine angles bilaterally shows no mass or definite pathological enhancement.  Queried area of enhancement in the right IAC (series 19 image 15) does not appear to have correlate o fracture deformities.  EKG leads overlie the chest. IMPRESSION: No radiographic evidence of acute disease in the chest. FINAL REPORT Attending Radiologist: Tessie Naranjo MD Date Signed Off: 08/25/2021 17:41    CT Angiogram Chest PE    Result Date: 9/3/2021 Contiguous enhanced axial images of the brain were also obtained. Coronal and sagittal reformatted images were submitted. Dose reduction technique was utilized.  3-D volume rendered and MIP images were processed on an off-line separate workstation under con carotid artery is patent. Right common carotid artery is patent from its origin to the carotid bifurcation. Mild amount of soft tissue plaque is noted without evidence of hemodynamically significant stenosis.  Calcifications and soft plaque are noted at the was utilized. 3-D volume rendered and MIP images were processed on an off-line separate workstation under concurrent supervision and then archived into PACS. 100 mL Omnipaque 350 was intravenously administered. Comparison: CT 7/15/2018.  Findings: CT head: noted without evidence of hemodynamically significant stenosis. Calcifications and soft plaque are noted at the carotid bifurcation without evidence of hemodynamically significant stenosis (less than 50% per NASCET criteria).  Right cervical internal caroti there is clinical concern for a radiographically occult hip fracture, consider further evaluation with pelvic MRI.  FINAL REPORT Attending Radiologist: Kelin Juarez MD Date Signed Off: 08/25/2021 17:48    Vasc US Venous Duplex LE Bilateral (DVT)    Result D Nonselective catheterization of the distal inferior vena cava. 3. Venogram of the inferior vena cava. 4. Deployment of B Carr Venatech permanent inferior vena cava filter. 5. Repeat venogram. Complications: None. Sedation/Anesthesia: None.  Technique: Info PANEL (14); Future    Fibromuscular dysplasia of carotid artery (HCC)    Vitamin D deficiency  -     VITAMIN D; Future    Screening for thyroid disorder  -     TSH W REFLEX TO FREE T4; Future    Screening for cholesterol level  -     LIPID PANEL;  Future

## 2021-09-14 NOTE — PATIENT INSTRUCTIONS
Start taking the flomax at night instead of in the morning.      Follow up with neurology regarding your dizziness    Follow up with pulmonology    Follow up with hematology    Follow up with urology    Get your labs done with home health    Follow up in 1

## 2021-09-15 NOTE — PROGRESS NOTES
Multiple attempts to reach the pt and messages left with no returned phone call. Pt completed HFU with PCP on 9/14/21, closing encounter.

## 2021-09-15 NOTE — TELEPHONE ENCOUNTER
Received orders for signature   Sister requested assissted living facility referrals   Given to triage

## 2021-09-15 NOTE — TELEPHONE ENCOUNTER
Received Order #: [id number]  MSW Consult effective 9/12/21 1wk1    Signed by Elijah Mcgill back at [phone]  Confirmed fax sent    Copy for triage  Original to scanning

## 2021-09-16 ENCOUNTER — TELEPHONE (OUTPATIENT)
Dept: INTERNAL MEDICINE CLINIC | Facility: CLINIC | Age: 66
End: 2021-09-16

## 2021-09-17 ENCOUNTER — TELEPHONE (OUTPATIENT)
Dept: INTERNAL MEDICINE CLINIC | Facility: CLINIC | Age: 66
End: 2021-09-17

## 2021-09-17 NOTE — TELEPHONE ENCOUNTER
Howard called from [facility] and said pt missed their second appt and they were unable to get a hold of pt     Thank you

## 2021-09-17 NOTE — TELEPHONE ENCOUNTER
Order Number: [id number]   r/s to next week d/t unable to reach pt.     To La [place] for signature  To fax back to [phone]

## 2021-09-17 NOTE — TELEPHONE ENCOUNTER
Incoming (mail or fax):  fax  Received from:  [address] [place] [address]   Documentation given to:  Triage in basket     Needs to be signed and returned

## 2021-09-17 NOTE — TELEPHONE ENCOUNTER
Venkatesh Baires called and wanted to let us know that Pt was not returning their calls so they missed her PT and nursing appt today.    Just JOHN

## 2021-09-18 ENCOUNTER — TELEPHONE (OUTPATIENT)
Dept: INTERNAL MEDICINE CLINIC | Facility: CLINIC | Age: 66
End: 2021-09-18

## 2021-09-18 NOTE — TELEPHONE ENCOUNTER
Incoming (mail or fax):  fax  Received from:  [facility]   Documentation given to:   Incoming triage bin    Orders to be signed

## 2021-09-20 RX ORDER — TAMSULOSIN HYDROCHLORIDE 0.4 MG/1
0.4 CAPSULE ORAL DAILY
COMMUNITY
Start: 2021-09-20 | End: 2021-12-14

## 2021-09-20 NOTE — TELEPHONE ENCOUNTER
Order # [id number]  \"pt stable, requested SN visit 1x every 2 weeks\"  \"Tamsulosin changed to 0.4mg daily on 9/14/21 at MD video consult\" -- Do not see change noted in 9/14/21 VV notes.     Received Physician Order from Monika Mata 89   To [address] for review & signatu

## 2021-09-21 ENCOUNTER — TELEPHONE (OUTPATIENT)
Dept: INTERNAL MEDICINE CLINIC | Facility: CLINIC | Age: 66
End: 2021-09-21

## 2021-09-21 DIAGNOSIS — R06.00 DOE (DYSPNEA ON EXERTION): ICD-10-CM

## 2021-09-21 DIAGNOSIS — I26.99 ACUTE PULMONARY EMBOLISM WITHOUT ACUTE COR PULMONALE, UNSPECIFIED PULMONARY EMBOLISM TYPE (HCC): Primary | ICD-10-CM

## 2021-09-21 DIAGNOSIS — M79.7 FIBROMYALGIA: ICD-10-CM

## 2021-09-21 DIAGNOSIS — I83.93 VARICOSE VEINS OF BOTH LOWER EXTREMITIES, UNSPECIFIED WHETHER COMPLICATED: ICD-10-CM

## 2021-09-21 DIAGNOSIS — I82.451 ACUTE DEEP VEIN THROMBOSIS (DVT) OF RIGHT PERONEAL VEIN (HCC): ICD-10-CM

## 2021-09-21 DIAGNOSIS — R42 VERTIGO: ICD-10-CM

## 2021-09-21 DIAGNOSIS — G50.9 TRIGEMINAL NEUROPATHY: ICD-10-CM

## 2021-09-21 NOTE — TELEPHONE ENCOUNTER
Pt stated that she was in the hospital for a blood clot and her leg and hip was hurting last night. Pt stated the last time it hurt like this she got a referral for PT and it helped a lot.                          Provider's Name?:  [address] [place] [address]

## 2021-09-22 NOTE — TELEPHONE ENCOUNTER
Claudia, please see note below  PT is asking for PT referral for hip pain  Pt has [facility] but requesting referral at [facility] in [facility] [facility] [facility] [facility]

## 2021-09-22 NOTE — TELEPHONE ENCOUNTER
Spoke with patient she was visited by [facility] Pt yesterday and they informed her they will be coming out to her home once a week.  The patient does not believe this is enough, she wished to have a referral placed to [address] [place] [address] Road PT in [facility], there

## 2021-09-22 NOTE — TELEPHONE ENCOUNTER
We cannot to both outpatient therapy and home health. If she wants to discontinue home health and start outpatient therapy instead than that is her choice.   We can place a referral for outpatient therapy if she would like

## 2021-09-22 NOTE — TELEPHONE ENCOUNTER
LMTCB with pt to clarify if pt needs PT with [facility] or pt is planning to do out patient Physical Therapy? Can she drive to therapy location?

## 2021-09-23 NOTE — TELEPHONE ENCOUNTER
Spoke to pt. Pt states would prefer outpatient PT. Pt will call [facility] to discontinue HH PT, but will keep on [facility] RN. Pending PT referral for approval.  To fax to [place] [facility] location at [phone].

## 2021-09-27 ENCOUNTER — TELEPHONE (OUTPATIENT)
Dept: INTERNAL MEDICINE CLINIC | Facility: CLINIC | Age: 66
End: 2021-09-27

## 2021-09-27 NOTE — TELEPHONE ENCOUNTER
[facility]  MSW eval performed, no additional visits required.   To Deonna Santiago for signature

## 2021-09-27 NOTE — TELEPHONE ENCOUNTER
Incoming (mail or fax): fax  Received from:  [address] [facility]  Documentation given to:  Triage    Home health order for social work needs signature

## 2021-09-30 ENCOUNTER — TELEPHONE (OUTPATIENT)
Dept: INTERNAL MEDICINE CLINIC | Facility: CLINIC | Age: 66
End: 2021-09-30

## 2021-09-30 ENCOUNTER — MED REC SCAN ONLY (OUTPATIENT)
Dept: INTERNAL MEDICINE CLINIC | Facility: CLINIC | Age: 66
End: 2021-09-30

## 2021-09-30 NOTE — TELEPHONE ENCOUNTER
Incoming (mail or fax):  fax  Received from:  [address] [place] [address]  Documentation given to:   Incoming triage     PT orders

## 2021-09-30 NOTE — TELEPHONE ENCOUNTER
Received orders from [address] [place] [address] for discontinuation of HH  Pt to start Outpatient PT at [facility] for review an signature

## 2021-10-01 ENCOUNTER — TELEPHONE (OUTPATIENT)
Dept: INTERNAL MEDICINE CLINIC | Facility: CLINIC | Age: 66
End: 2021-10-01

## 2021-10-01 NOTE — TELEPHONE ENCOUNTER
Order# [id number]    Signed by [facility] back to Monika Banks at [phone]  Confirmed fax sent  Sent to scanning

## 2021-10-06 ENCOUNTER — TELEPHONE (OUTPATIENT)
Dept: INTERNAL MEDICINE CLINIC | Facility: CLINIC | Age: 66
End: 2021-10-06

## 2021-10-06 ENCOUNTER — MED REC SCAN ONLY (OUTPATIENT)
Dept: INTERNAL MEDICINE CLINIC | Facility: CLINIC | Age: 66
End: 2021-10-06

## 2021-10-06 NOTE — TELEPHONE ENCOUNTER
PCP removal process started.   Labs forwarded to Dr. Turner [place] office at [phone]  Confirmed fax sent

## 2021-10-06 NOTE — TELEPHONE ENCOUNTER
Received results for the following tests: CMP, FLP, TSH & VIT D completed on 10/5/21 at [address] [place] [address] as able. To Claudia for review.

## 2021-10-06 NOTE — TELEPHONE ENCOUNTER
This patient established with a new internal medicine provider yesterday. Please forward the labs to this provider. Also start the PCP removal process. Thank you.

## 2021-10-06 NOTE — TELEPHONE ENCOUNTER
Incoming (mail or fax):  fax  Received from:  [employer]   Documentation given to:  Leon Aldridge results bin

## 2021-11-05 ENCOUNTER — TELEPHONE (OUTPATIENT)
Dept: INTERNAL MEDICINE CLINIC | Facility: CLINIC | Age: 66
End: 2021-11-05

## 2021-11-05 NOTE — TELEPHONE ENCOUNTER
[facility]  Dx codes for all labs drawn on 10/5/21.    Please CB with that info and reference #[id number]  Thanks

## 2021-11-22 RX ORDER — POTASSIUM CHLORIDE 20 MEQ/1
TABLET, EXTENDED RELEASE ORAL
Qty: 180 TABLET | Refills: 0 | OUTPATIENT
Start: 2021-11-22

## 2021-12-14 PROBLEM — E46 PROTEIN-CALORIE MALNUTRITION, UNSPECIFIED SEVERITY (HCC): Status: ACTIVE | Noted: 2021-12-14

## 2022-01-10 PROBLEM — R10.84 DIFFUSE ABDOMINAL PAIN: Status: RESOLVED | Noted: 2021-05-26 | Resolved: 2022-01-10

## 2022-01-10 PROBLEM — R06.00 DOE (DYSPNEA ON EXERTION): Status: RESOLVED | Noted: 2021-09-11 | Resolved: 2022-01-10

## 2022-01-10 PROBLEM — R06.09 DOE (DYSPNEA ON EXERTION): Status: RESOLVED | Noted: 2021-09-11 | Resolved: 2022-01-10

## 2022-01-10 PROBLEM — K92.2 GASTROINTESTINAL HEMORRHAGE: Status: RESOLVED | Noted: 2021-05-26 | Resolved: 2022-01-10

## 2022-01-10 PROBLEM — R42 VERTIGO: Status: RESOLVED | Noted: 2018-07-26 | Resolved: 2022-01-10

## 2022-01-12 PROBLEM — F32.0 CURRENT MILD EPISODE OF MAJOR DEPRESSIVE DISORDER WITHOUT PRIOR EPISODE (HCC): Status: ACTIVE | Noted: 2022-01-12

## 2022-02-23 ENCOUNTER — INITIAL APN SNF VISIT (OUTPATIENT)
Dept: INTERNAL MEDICINE CLINIC | Facility: SKILLED NURSING FACILITY | Age: 67
End: 2022-02-23

## 2022-02-23 VITALS
WEIGHT: 109.63 LBS | BODY MASS INDEX: 15 KG/M2 | OXYGEN SATURATION: 98 % | HEART RATE: 104 BPM | RESPIRATION RATE: 20 BRPM | TEMPERATURE: 98 F | SYSTOLIC BLOOD PRESSURE: 98 MMHG | DIASTOLIC BLOOD PRESSURE: 66 MMHG

## 2022-02-23 PROCEDURE — 99310 SBSQ NF CARE HIGH MDM 45: CPT | Performed by: NURSE PRACTITIONER

## 2022-02-23 PROCEDURE — 1123F ACP DISCUSS/DSCN MKR DOCD: CPT | Performed by: NURSE PRACTITIONER

## 2022-02-28 ENCOUNTER — SNF VISIT (OUTPATIENT)
Dept: INTERNAL MEDICINE CLINIC | Facility: SKILLED NURSING FACILITY | Age: 67
End: 2022-02-28

## 2022-02-28 VITALS
RESPIRATION RATE: 20 BRPM | TEMPERATURE: 98 F | BODY MASS INDEX: 15 KG/M2 | WEIGHT: 109.63 LBS | HEART RATE: 76 BPM | DIASTOLIC BLOOD PRESSURE: 71 MMHG | OXYGEN SATURATION: 98 % | SYSTOLIC BLOOD PRESSURE: 124 MMHG

## 2022-02-28 PROCEDURE — 99309 SBSQ NF CARE MODERATE MDM 30: CPT | Performed by: NURSE PRACTITIONER

## 2022-03-02 ENCOUNTER — SNF VISIT (OUTPATIENT)
Dept: INTERNAL MEDICINE CLINIC | Facility: SKILLED NURSING FACILITY | Age: 67
End: 2022-03-02

## 2022-03-02 ENCOUNTER — EXTERNAL FACILITY (OUTPATIENT)
Dept: PULMONOLOGY | Facility: CLINIC | Age: 67
End: 2022-03-02

## 2022-03-02 VITALS
WEIGHT: 109.63 LBS | BODY MASS INDEX: 15 KG/M2 | OXYGEN SATURATION: 99 % | TEMPERATURE: 97 F | HEART RATE: 99 BPM | RESPIRATION RATE: 20 BRPM | SYSTOLIC BLOOD PRESSURE: 90 MMHG | DIASTOLIC BLOOD PRESSURE: 59 MMHG

## 2022-03-02 PROCEDURE — 99305 1ST NF CARE MODERATE MDM 35: CPT | Performed by: PHYSICIAN ASSISTANT

## 2022-03-02 PROCEDURE — 99309 SBSQ NF CARE MODERATE MDM 30: CPT | Performed by: NURSE PRACTITIONER

## 2022-03-09 ENCOUNTER — EXTERNAL FACILITY (OUTPATIENT)
Dept: PULMONOLOGY | Facility: CLINIC | Age: 67
End: 2022-03-09

## 2022-03-09 PROCEDURE — 99307 SBSQ NF CARE SF MDM 10: CPT | Performed by: PHYSICIAN ASSISTANT

## 2022-03-11 ENCOUNTER — SNF VISIT (OUTPATIENT)
Dept: INTERNAL MEDICINE CLINIC | Facility: SKILLED NURSING FACILITY | Age: 67
End: 2022-03-11

## 2022-03-11 PROCEDURE — 99309 SBSQ NF CARE MODERATE MDM 30: CPT | Performed by: NURSE PRACTITIONER

## 2022-03-14 ENCOUNTER — SNF VISIT (OUTPATIENT)
Dept: INTERNAL MEDICINE CLINIC | Facility: SKILLED NURSING FACILITY | Age: 67
End: 2022-03-14

## 2022-03-14 VITALS
TEMPERATURE: 98 F | DIASTOLIC BLOOD PRESSURE: 74 MMHG | OXYGEN SATURATION: 98 % | HEART RATE: 78 BPM | SYSTOLIC BLOOD PRESSURE: 120 MMHG | WEIGHT: 115.38 LBS | RESPIRATION RATE: 20 BRPM | BODY MASS INDEX: 16 KG/M2

## 2022-03-14 PROCEDURE — 99309 SBSQ NF CARE MODERATE MDM 30: CPT | Performed by: NURSE PRACTITIONER

## 2022-03-16 ENCOUNTER — EXTERNAL FACILITY (OUTPATIENT)
Dept: PULMONOLOGY | Facility: CLINIC | Age: 67
End: 2022-03-16

## 2022-03-16 ENCOUNTER — SNF VISIT (OUTPATIENT)
Dept: INTERNAL MEDICINE CLINIC | Facility: SKILLED NURSING FACILITY | Age: 67
End: 2022-03-16

## 2022-03-16 VITALS
DIASTOLIC BLOOD PRESSURE: 60 MMHG | RESPIRATION RATE: 20 BRPM | WEIGHT: 115.38 LBS | TEMPERATURE: 97 F | SYSTOLIC BLOOD PRESSURE: 105 MMHG | OXYGEN SATURATION: 98 % | HEART RATE: 67 BPM | BODY MASS INDEX: 16 KG/M2

## 2022-03-16 PROCEDURE — 99310 SBSQ NF CARE HIGH MDM 45: CPT | Performed by: NURSE PRACTITIONER

## 2022-03-16 PROCEDURE — 99308 SBSQ NF CARE LOW MDM 20: CPT | Performed by: PHYSICIAN ASSISTANT

## 2022-08-26 ENCOUNTER — TELEPHONE (OUTPATIENT)
Dept: INTERNAL MEDICINE | Age: 67
End: 2022-08-26

## 2023-02-02 ENCOUNTER — HOSPITAL ENCOUNTER (OUTPATIENT)
Age: 68
End: 2023-02-02
Attending: SPECIALIST | Admitting: SPECIALIST

## 2023-02-20 DIAGNOSIS — G89.18 POST-OP PAIN: Primary | ICD-10-CM

## 2023-02-20 RX ORDER — HYDROCODONE BITARTRATE AND ACETAMINOPHEN 5; 325 MG/1; MG/1
1 TABLET ORAL EVERY 6 HOURS PRN
Qty: 24 TABLET | Refills: 0 | Status: SHIPPED | OUTPATIENT
Start: 2023-02-20

## 2023-02-20 RX ORDER — CEPHALEXIN 500 MG/1
500 CAPSULE ORAL 3 TIMES DAILY
Qty: 15 CAPSULE | Refills: 0 | Status: SHIPPED | OUTPATIENT
Start: 2023-02-20 | End: 2023-02-25

## 2023-02-20 RX ORDER — FLUCONAZOLE 200 MG/1
200 TABLET ORAL DAILY
Qty: 2 TABLET | Refills: 0 | Status: SHIPPED | OUTPATIENT
Start: 2023-02-20 | End: 2023-02-22

## 2023-02-20 RX ORDER — ONDANSETRON 4 MG/1
4 TABLET, FILM COATED ORAL EVERY 8 HOURS PRN
Qty: 8 TABLET | Refills: 0 | Status: SHIPPED | OUTPATIENT
Start: 2023-02-20

## 2023-02-28 RX ORDER — ENOXAPARIN SODIUM 100 MG/ML
40 INJECTION SUBCUTANEOUS DAILY
Qty: 4 ML | Refills: 0 | Status: SHIPPED | OUTPATIENT
Start: 2023-02-28 | End: 2023-03-10

## 2024-07-05 RX ORDER — CLONAZEPAM 2 MG/1
3 TABLET ORAL 2 TIMES DAILY
COMMUNITY

## 2024-07-05 RX ORDER — GABAPENTIN 600 MG/1
1200 TABLET ORAL 3 TIMES DAILY
COMMUNITY

## 2024-07-05 RX ORDER — BACLOFEN 20 MG/1
35 TABLET ORAL 2 TIMES DAILY
COMMUNITY

## 2024-07-05 RX ORDER — LEVOTHYROXINE SODIUM 0.07 MG/1
75 TABLET ORAL
COMMUNITY

## 2024-07-12 NOTE — DISCHARGE INSTRUCTIONS
Post-Operative Information for Patients following Sacral Neuromodulation Stage I Test Stimulation    WHAT TO EXPECT AT HOME:     The stage 1 for sacral neuromodulation therapy is performed to evaluate whether modulating the nerves to your bladder will help control your symptoms of overactive bladder, urinary frequency/urgency, and incontinence (urinary or fecal).   Using a needle and a thin wire, an electrical lead was placed through a small incision past your sacrum (tailbone). The lead is connected to a temporary battery (Verify External Neurostimulator), which is secured to your back with a bandage. This temporary battery connects wirelessly to your Verify Controller. The controller is used during the test period to make changes to the stimulation you receive.  Your sacral neuromodulation representative will help you become familiar with the device during your test phase.  We consider a treatment “successful” if you experience a 50% or more improvement in your bladder control symptoms.   It may take a couple days for the therapy to take effect   If you feel the test phase was successful, a permanent battery will be placed at your next surgery. If it is not successful, the lead and all implanted material will be removed at your next surgery.     SHOWERS  Make sure to keep bandages dry  Please make sure to take a frontal shower or sponge bath  No baths/swimming/hot tubs   It is OK if the edges of the bandages get slightly wet or sweaty, but the goal is to keep the bandages as dry as possible.     BANDAGES  The bandages should remain in place for the duration of the test. If for any reason, there is a concern of it the becoming wet or loose, please let the office or your [employer] representative know so it may be changed. If you are instructed by the office to change the bandage, NO SCISSORS should be used in that area as you may accidentally cut a wire.  We have provided you with TegadermÆ dressings. You can use it  to reinforce the edges of the bandage if needed.     ACTIVITIES   Restrict activities during the test period; being active too soon can increase the likelihood of complications.  Do not lift anything over 10 pounds during test phase  Avoid activities where you bend/twist at the waist   Avoid snug/tight-fitting clothing  Avoid scooting when getting in and out of a chair or car    DIET  Try not to alter your everyday diet or water intake during the test period     PAIN CONTROL  Incisional pain is normal. Rest and take your pain medication before the pain becomes too intense.  First step for your pain control is to use over the counter Tylenol and ibuprofen  For additional pain control, you may have been prescribed a narcotic~ pain medication, such as Oxycodone, Percocet, Norco, Tylenol #3, Valium, Tramadol, or Hydrocodone.   Use narcotic pain medications for severe pain not improved by Tylenol and ibuprofen in the first few days after surgery. Transition to only Tylenol or ibuprofen as soon as possible.      PAIN MEDICATION INFORMATION:  *The maximum amount of Tylenol you can take in a 24 hour period is 4000 mg. Taking over this amount could cause liver damage. Make sure that if you are taking additional narcotic pain medication it does not contain acetaminophen, the active ingredient in Tylenol. lf it does, you must account for in your daily total. For example: Norco or Percocet typically contain 325mg of Tylenol. Wean this medication as tolerated. Tylenol is processed by your liver. Do not take Tylenol if you are have a history of liver failure. Do not mix with alcohol.   **You may take 200-800mg of ibuprofen (Advil) every 8 hours as needed for pain. This will help with pain from inflammation (swelling). Ibuprofen is processed by your kidneys. If you have any history of kidney disease you should avoid ibuprofen and all types of NSAIDS (Aleve, Motrin, Advil). Avoid if you have a history of stomach ulcers. If you are  taking the maximum dose of ibuprofen (800mg every 8 hours), reduce this amount to 200-400mg ibuprofen every 8 hours as your pain improves.   ~Remember that narcotics are addictive, sedating, and constipating.  You should be taking a stool softener once or twice a day while on this medication. If you are prescribed narcotic pain medication, please use the medication as instructed. Do not use more than instructed. Do not take this medication with alcohol, sedatives, anti-anxiety medications, or sleep aids.  ~ It is important to keep narcotic pills safely stored, as it is at great risk of being stolen or misused by family, friends, or even strangers. Please be sure to dispose of leftover pain medication after you have recovered. You may dispose of unused narcotic medications in the trash with an unpleasant substance such as coffee grounds or cat litter. You can also check FDA.gov to assess which medications can be safely flushed down the toilet.     INFECTION  Serious infections with this procedure are a rare occurrence  The likelihood of infection increases anytime the skin is opened.   Please take all of the prescribed antibiotics for prevention  Symptoms suggesting infection include:   fever > 101.5 ?F (38.5?C)  foul smelling or yellow drainage at the incision site  pain, redness, and swelling.   If you experience these symptoms, contact your physician.    FOLLOW -UP  During the Stage 1 test, you may notice symptom improvement. It is important that you keep track of these changes in your symptom tracker that we have provided.  The sacral neuromodulation representative will call you regularly to follow up on your symptoms.   Please call the office after 1 week to update us on your overall satisfaction, improvement in continent episodes, and improvement in urgency-frequency. The  may ask you to fax or email to us your symptom tracker.      WHEN SHOULD I CALL THE DOCTOR?     Call your doctor if  you experience any of the following symptoms:   Temperature greater than 101.5 degree F (38.5 degree C)   Signs of infection at the wound site including significant pain, drainage, swelling, and redness beyond the rim of the incision.   Persistent vomiting   Worsening pain that is not relieved by prescription pain medication     If questions or concerns:   Call [phone] to reach your physician's office or send a [employer] message and either myself or one of my staff members will get back to you as soon as possible.     OR: Go to the nearest Emergency Room if you feel any signs of symptoms that warrant physician's immediate attention.     Fauzia Joaquin DO, [facility]  [phone]       HOME INSTRUCTIONS  [facility] HOME CARE INSTRUCTIONS: POST-OP ANESTHESIA  The medication that you received for sedation or general anesthesia can last up to 24 hours. Your judgment and reflexes may be altered, even if you feel like your normal self.      We Recommend:   Do not drive any motor vehicle or bicycle   Avoid mowing the lawn, playing sports, or working with power tools/applicances (power saws, electric knives or mixers)   That you have someone stay with you on your first night home   Do not drink alcohol or take sleeping pills or tranquilizers   Do not sign legal documents within 24 hours of your procedure   If you had a nerve block for your surgery, take extra care not to put any pressure on your arm or hand for 24 hours    It is normal:  For you to have a sore throat if you had a breathing tube during surgery (while you were asleep!). The sore throat should get better within 48 hours. You can gargle with warm salt water (1/2 tsp in 4 oz warm water) or use a throat lozenge for comfort  To feel muscle aches or soreness especially in the abdomen, chest or neck. The achy feeling should go away in the next 24 hours  To feel weak, sleepy or \"wiped out\". Your should start feeling better in the next 24 hours.   To experience  mild discomforts such as sore lip or tongue, headache, cramps, gas pains or a bloated feeling in your abdomen.   To experience mild back pain or soreness for a day or two if you had spinal or epidural anesthesia.   If you had laparoscopic surgery, to feel shoulder pain or discomfort on the day of surgery.   For some patients to have nausea after surgery/anesthesia    If you feel nausea or experience vomiting:   Try to move around less.   Eat less than usual or drink only liquids until the next morning   Nausea should resolve in about 24 hours    If you have a problem when you are at home:    Call your surgeons office   Discharge Instructions: After Your Surgery  You’ve just had surgery. During surgery, you were given medicine called anesthesia to keep you relaxed and free of pain. After surgery, you may have some pain or nausea. This is common. Here are some tips for feeling better and getting well after surgery.   Going home  Your healthcare provider will show you how to take care of yourself when you go home. They'll also answer your questions. Have an adult family member or friend drive you home. For the first 24 hours after your surgery:   Don't drive or use heavy equipment.  Don't make important decisions or sign legal papers.  Take medicines as directed.  Don't drink alcohol.  Have someone stay with you, if needed. They can watch for problems and help keep you safe.  Be sure to go to all follow-up visits with your healthcare provider. And rest after your surgery for as long as your provider tells you to.   Coping with pain  If you have pain after surgery, pain medicine will help you feel better. Take it as directed, before pain becomes severe. Also, ask your healthcare provider or pharmacist about other ways to control pain. This might be with heat, ice, or relaxation. And follow any other instructions your surgeon or nurse gives you.      Stay on schedule with your medicine.     Tips for taking pain  medicine  To get the best relief possible, remember these points:   Pain medicines can upset your stomach. Taking them with a little food may help.  Most pain relievers taken by mouth need at least 20 to 30 minutes to start to work.  Don't wait till your pain becomes severe before you take your medicine. Try to time your medicine so that you can take it before starting an activity. This might be before you get dressed, go for a walk, or sit down for dinner.  Constipation is a common side effect of some pain medicines. Call your healthcare provider before taking any medicines such as laxatives or stool softeners to help ease constipation. Also ask if you should skip any foods. Drinking lots of fluids and eating foods such as fruits and vegetables that are high in fiber can also help. Remember, don't take laxatives unless your surgeon has prescribed them.  Drinking alcohol and taking pain medicine can cause dizziness and slow your breathing. It can even be deadly. Don't drink alcohol while taking pain medicine.  Pain medicine can make you react more slowly to things. Don't drive or run machinery while taking pain medicine.  Your healthcare provider may tell you to take acetaminophen to help ease your pain. Ask them how much you're supposed to take each day. Acetaminophen or other pain relievers may interact with your prescription medicines or other over-the-counter (OTC) medicines. Some prescription medicines have acetaminophen and other ingredients in them. Using both prescription and OTC acetaminophen for pain can cause you to accidentally overdose. Read the labels on your OTC medicines with care. This will help you to clearly know the list of ingredients, how much to take, and any warnings. It may also help you not take too much acetaminophen. If you have questions or don't understand the information, ask your pharmacist or healthcare provider to explain it to you before you take the OTC medicine.   Managing  nausea  Some people have an upset stomach (nausea) after surgery. This is often because of anesthesia, pain, or pain medicine, less movement of food in the stomach, or the stress of surgery. These tips will help you handle nausea and eat healthy foods as you get better. If you were on a special food plan before surgery, ask your healthcare provider if you should follow it while you get better. Check with your provider on how your eating should progress. It may depend on the surgery you had. These general tips may help:   Don't push yourself to eat. Your body will tell you when to eat and how much.  Start off with clear liquids and soup. They're easier to digest.  Next try semi-solid foods as you feel ready. These include mashed potatoes, applesauce, and gelatin.  Slowly move to solid foods. Don’t eat fatty, rich, or spicy foods at first.  Don't force yourself to have 3 large meals a day. Instead eat smaller amounts more often.  Take pain medicines with a small amount of solid food, such as crackers or toast. This helps prevent nausea.  When to call your healthcare provider  Call your healthcare provider right away if you have any of these:   You still have too much pain, or the pain gets worse, after taking the medicine. The medicine may not be strong enough. Or there may be a complication from the surgery.  You feel too sleepy, dizzy, or groggy. The medicine may be too strong.  Side effects such as nausea or vomiting. Your healthcare provider may advise taking other medicines to .  Skin changes such as rash, itching, or hives. This may mean you have an allergic reaction. Your provider may advise taking other medicines.  The incision looks different (for instance, part of it opens up).  Bleeding or fluid leaking from the incision site, and weren't told to expect that.  Fever of 100.4°F (38°C) or higher, or as directed by your provider.  Call 911  Call 911 right away if you have:   Trouble breathing  Facial  swelling    If you have obstructive sleep apnea   You were given anesthesia medicine during surgery to keep you comfortable and free of pain. After surgery, you may have more apnea spells because of this medicine and other medicines you were given. The spells may last longer than normal.    At home:  Keep using the continuous positive airway pressure (CPAP) device when you sleep. Unless your healthcare provider tells you not to, use it when you sleep, day or night. CPAP is a common device used to treat obstructive sleep apnea.  Talk with your provider before taking any pain medicine, muscle relaxants, or sedatives. Your provider will tell you about the possible dangers of taking these medicines.  Contact your provider if your sleeping changes a lot even when taking medicines as directed.  Yolanda last reviewed this educational content on 10/1/2021  © 2928-2533 [employer]. All rights reserved. This information is not intended as a substitute for professional medical care. Always follow your healthcare professional's instructions.

## 2024-07-15 ENCOUNTER — APPOINTMENT (OUTPATIENT)
Dept: GENERAL RADIOLOGY | Facility: HOSPITAL | Age: 69
End: 2024-07-15
Attending: STUDENT IN AN ORGANIZED HEALTH CARE EDUCATION/TRAINING PROGRAM
Payer: MEDICARE

## 2024-07-15 ENCOUNTER — HOSPITAL ENCOUNTER (OUTPATIENT)
Facility: HOSPITAL | Age: 69
Setting detail: HOSPITAL OUTPATIENT SURGERY
Discharge: HOME | End: 2024-07-15
Attending: STUDENT IN AN ORGANIZED HEALTH CARE EDUCATION/TRAINING PROGRAM | Admitting: STUDENT IN AN ORGANIZED HEALTH CARE EDUCATION/TRAINING PROGRAM
Payer: MEDICARE

## 2024-07-15 ENCOUNTER — ANESTHESIA EVENT (OUTPATIENT)
Dept: SURGERY | Facility: HOSPITAL | Age: 69
End: 2024-07-15
Payer: MEDICARE

## 2024-07-15 ENCOUNTER — ANESTHESIA (OUTPATIENT)
Dept: SURGERY | Facility: HOSPITAL | Age: 69
End: 2024-07-15
Payer: MEDICARE

## 2024-07-15 VITALS
RESPIRATION RATE: 16 BRPM | HEIGHT: 72 IN | WEIGHT: 133 LBS | BODY MASS INDEX: 18.01 KG/M2 | HEART RATE: 58 BPM | SYSTOLIC BLOOD PRESSURE: 98 MMHG | OXYGEN SATURATION: 94 % | TEMPERATURE: 97 F | DIASTOLIC BLOOD PRESSURE: 68 MMHG

## 2024-07-15 PROCEDURE — 01HY3MZ INSERTION OF NEUROSTIMULATOR LEAD INTO PERIPHERAL NERVE, PERCUTANEOUS APPROACH: ICD-10-PCS | Performed by: STUDENT IN AN ORGANIZED HEALTH CARE EDUCATION/TRAINING PROGRAM

## 2024-07-15 DEVICE — IMPLANTABLE DEVICE: Type: IMPLANTABLE DEVICE | Site: BACK | Status: FUNCTIONAL

## 2024-07-15 DEVICE — LEAD NEUROSTIM L28CM DIA1.27MM 4 IN LN: Type: IMPLANTABLE DEVICE | Site: BACK | Status: FUNCTIONAL

## 2024-07-15 RX ORDER — HYDROMORPHONE HYDROCHLORIDE 1 MG/ML
0.6 INJECTION, SOLUTION INTRAMUSCULAR; INTRAVENOUS; SUBCUTANEOUS EVERY 5 MIN PRN
Status: DISCONTINUED | OUTPATIENT
Start: 2024-07-15 | End: 2024-07-15

## 2024-07-15 RX ORDER — MORPHINE SULFATE 4 MG/ML
2 INJECTION, SOLUTION INTRAMUSCULAR; INTRAVENOUS EVERY 10 MIN PRN
Status: DISCONTINUED | OUTPATIENT
Start: 2024-07-15 | End: 2024-07-15

## 2024-07-15 RX ORDER — MORPHINE SULFATE 4 MG/ML
4 INJECTION, SOLUTION INTRAMUSCULAR; INTRAVENOUS EVERY 10 MIN PRN
Status: DISCONTINUED | OUTPATIENT
Start: 2024-07-15 | End: 2024-07-15

## 2024-07-15 RX ORDER — SULFAMETHOXAZOLE AND TRIMETHOPRIM 800; 160 MG/1; MG/1
1 TABLET ORAL 2 TIMES DAILY
Qty: 6 TABLET | Refills: 0 | Status: SHIPPED | OUTPATIENT
Start: 2024-07-15 | End: 2024-07-18

## 2024-07-15 RX ORDER — MORPHINE SULFATE 10 MG/ML
6 INJECTION, SOLUTION INTRAMUSCULAR; INTRAVENOUS EVERY 10 MIN PRN
Status: DISCONTINUED | OUTPATIENT
Start: 2024-07-15 | End: 2024-07-15

## 2024-07-15 RX ORDER — LIDOCAINE HYDROCHLORIDE 10 MG/ML
INJECTION, SOLUTION EPIDURAL; INFILTRATION; INTRACAUDAL; PERINEURAL AS NEEDED
Status: DISCONTINUED | OUTPATIENT
Start: 2024-07-15 | End: 2024-07-15 | Stop reason: HOSPADM

## 2024-07-15 RX ORDER — BUPIVACAINE HYDROCHLORIDE 5 MG/ML
INJECTION, SOLUTION EPIDURAL; INTRACAUDAL AS NEEDED
Status: DISCONTINUED | OUTPATIENT
Start: 2024-07-15 | End: 2024-07-15 | Stop reason: HOSPADM

## 2024-07-15 RX ORDER — ACETAMINOPHEN 500 MG
1000 TABLET ORAL ONCE
Status: COMPLETED | OUTPATIENT
Start: 2024-07-15 | End: 2024-07-15

## 2024-07-15 RX ORDER — HYDROMORPHONE HYDROCHLORIDE 1 MG/ML
0.2 INJECTION, SOLUTION INTRAMUSCULAR; INTRAVENOUS; SUBCUTANEOUS EVERY 5 MIN PRN
Status: DISCONTINUED | OUTPATIENT
Start: 2024-07-15 | End: 2024-07-15

## 2024-07-15 RX ORDER — NALOXONE HYDROCHLORIDE 0.4 MG/ML
0.08 INJECTION, SOLUTION INTRAMUSCULAR; INTRAVENOUS; SUBCUTANEOUS AS NEEDED
Status: DISCONTINUED | OUTPATIENT
Start: 2024-07-15 | End: 2024-07-15

## 2024-07-15 RX ORDER — SODIUM CHLORIDE 9 MG/ML
INJECTION, SOLUTION INTRAVENOUS CONTINUOUS
Status: DISCONTINUED | OUTPATIENT
Start: 2024-07-15 | End: 2024-07-15

## 2024-07-15 RX ORDER — HYDROMORPHONE HYDROCHLORIDE 1 MG/ML
0.4 INJECTION, SOLUTION INTRAMUSCULAR; INTRAVENOUS; SUBCUTANEOUS EVERY 5 MIN PRN
Status: DISCONTINUED | OUTPATIENT
Start: 2024-07-15 | End: 2024-07-15

## 2024-07-15 RX ORDER — SODIUM CHLORIDE, SODIUM LACTATE, POTASSIUM CHLORIDE, CALCIUM CHLORIDE 600; 310; 30; 20 MG/100ML; MG/100ML; MG/100ML; MG/100ML
INJECTION, SOLUTION INTRAVENOUS CONTINUOUS
Status: DISCONTINUED | OUTPATIENT
Start: 2024-07-15 | End: 2024-07-15

## 2024-07-15 NOTE — INTERVAL H&P NOTE
Pre-op Diagnosis: Urgency incontinence, urgency/frequency syndrome    The above referenced H&P was reviewed by Fauzia Joaquin DO on 7/15/2024, the patient was examined and no significant changes have occurred in the patient's condition since the H&P was performed.  I discussed with the patient and/or legal representative the potential benefits, risks and side effects of this procedure; the likelihood of the patient achieving goals; and potential problems that might occur during recuperation.  I discussed reasonable alternatives to the procedure, including risks, benefits and side effects related to the alternatives and risks related to not receiving this procedure.  We will proceed with procedure as planned.

## 2024-07-15 NOTE — ANESTHESIA POSTPROCEDURE EVALUATION
Patient: Frieda Dumont    Procedure Summary       Date: 07/15/24 Room / Location: [facility] MAIN OR  / [facility] MAIN OR    Anesthesia Start: 1149 Anesthesia Stop: 1230    Procedure: [employer] stage 1 sacral neuromodulation lead placement (Back) Diagnosis: (Urgency incontinence, urgency/frequency syndrome)    Surgeons: Fauzia Joaquin DO Anesthesiologist: Rah Moreno MD    Anesthesia Type: MAC ASA Status: 1            Anesthesia Type: No value filed.    Vitals Value Taken Time   BP 98/68 07/15/24 1322   Temp 97.2 °F (36.2 °C) 07/15/24 1232   Pulse 58 07/15/24 1322   Resp 16 07/15/24 1322   SpO2 94 % 07/15/24 1322       EM AN Post Evaluation:   Patient Evaluated in PACU  Patient Participation: complete - patient participated  Level of Consciousness: awake  Pain Score: 2  Pain Management: adequate  Airway Patency:patent  Dental exam unchanged from preop  Yes    Cardiovascular Status: hemodynamically stable  Respiratory Status: spontaneous ventilation  Postoperative Hydration euvolemic      Rah Moreno MD  7/15/2024 2:41 PM

## 2024-07-15 NOTE — H&P
67yo F w feelings of incomplete emptying having to massage her bladder and do different maneuvers to void. STF 8-10, nocturia 3-4x.     Has tried flomax, cardura, PFPT and Myrbetriq              History/Other:         Current Outpatient Medications   Medication Sig Dispense Refill    baclofen 10 MG Oral Tab 3.5 tabs am, 3.5 tabs afternoon, 1 at hs 720 tablet 1    clonazePAM 2 MG Oral Tab TAKE 1 & 1/2 TABLETS (3 MG TOTAL) BY MOUTH IN THE MORNING, THEN TAKE 1 & 1/2 TABLETS (3 MG TOTAL) BY MOUTH IN THE AFTERNOON, THEN TAKE 1 TABLET (2 MG TOTAL) BY MOUTH AT NIGHT. 240 tablet 0    levothyroxine 75 MCG Oral Tab Take 1 tablet (75 mcg total) by mouth daily. 90 tablet 3    valACYclovir 1 G Oral Tab TAKE 2 TABLETS BY MOUTH AT ONSET. REPEAT 12 HOURS LATER. 8 tablet 1    alendronate 70 MG Oral Tab Take 1 tablet (70 mg total) by mouth once a week. 12 tablet 1    ketoconazole 2 % External Shampoo USE TO CLEANSE SCALP DAILY 360 mL 4    triamcinolone 0.1 % External Ointment APPLY TO AFFECTED AREA TWICE A DAY X1-2 WEEKS, THEN TAPER TO 1-2X WEEKLY AS NEEDED 80 g 2    doxazosin 4 MG Oral Tab Take 1 tablet (4 mg total) by mouth 2 (two) times a day. Monitor your BP carefully. Do not take it if your systolic (upper) blood pressure is less than 90. 180 tablet 3    GABAPENTIN 600 MG Oral Tab TAKE 2 TABLETS BY MOUTH THREE TIMES A DAY. 540 tablet 0    PARoxetine HCl 40 MG Oral Tab Take 0.5 tablets (20 mg total) by mouth 2 (two) times daily. 90 tablet 1    fluticasone propionate 50 MCG/ACT Nasal Suspension SPRAY 2 TIMES INTO NASAL ROUTE DAILY 48 g 2    famotidine 20 MG Oral Tab Take 20 mg by mouth 2 (two) times daily.        estradiol (ESTRACE) 0.1 MG/GM Vaginal Cream 1/2 index finger of cream per vagina 2 x a week 42.5 g 11    potassium chloride 20 MEQ Oral Tab CR Take 2 tablets (40 mEq total) by mouth daily. 180 tablet 3    nabumetone 500 MG Oral Tab Take 1 tablet (500 mg total) by mouth 2 (two) times daily. 180 tablet 3    Clobetasol  Propionate 0.05 % External Solution APPLY TO SCALP TWICE A DAY X 1-2 WEEKS, THEN TAPER TO AS NEEDED FOR FLARES 50 mL 0    Cholecalciferol (VITAMIN D3) 75 MCG (3000 UT) Oral Tab Take 1 tablet by mouth daily.        Melatonin 1 MG Oral Cap Take 3 mg by mouth daily.        Saline 0.2 % Nasal Solution by Nasal route nightly.          DAILY MULTIVITAMIN OR 1 po qd               Past Medical History:   Diagnosis Date    B12 deficiency      Bacterial overgrowth syndrome      Basal cell carcinoma of lip 05/11/2015    Bowel obstruction (HCC)      Bursitis       iliotibial     Calculus of kidney      Cardiomyopathy ([facility]) 2008     s/p cath (3/25/2008)-normal coronary arteries    Cecal volvulus ([facility])      Chronic constipation      Chronic interstitial cystitis      Chronic LBP      Closed fracture of one rib of left side with routine healing 04/26/2019    Dyssynergia       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    Environmental allergies      Fibromyalgia      Folate deficiency      History of Anorexia 2009    History of TMJ disorder      HPV in female 1990s, then 2014, 16     ASCUS 2014 w/HPV+, cert letter sent    Hypersomnia      Hypothyroidism      Insomnia      Internal hemorrhoids 2011    Keratoconjunctivitis sicca not specified as Sjogren's 11/11/2011    Migraines      Myofascial pain      SACHIN (obstructive sleep apnea) AHI 14 08/09/2011    Osteopenia 07/09/2015    Peripheral neuropathy      Pneumatosis cystoides intestinalis      Polypharmacy 5/22/2023    Pulmonary embolism ([facility]) 09/03/2021    Reflux      Rhabdomyolysis 03/2008    RLS (restless legs syndrome) 09/10/2008    Sciatica of left side 04/26/2019    Sepsis, unspecified 2008     pneumonia sepsis    Sicca ([facility]) 2011    Sleep apnea, obstructive PSG 11-5-10 Oral TX 6-6-12     AHI 14 Supine 36 Positional, CPAP 5 cm H2O/ Tap III at 2 mm    Thyroid nodule       \"s/p resection\"    Trigeminal neuralgia      Vitamin D deficiency 07/09/2015              Past Surgical History:    Procedure Laterality Date    APPENDECTOMY   ?    BENIGN BIOPSY LEFT       BENIGN BIOPSY RIGHT         unknown date    BREAST SURGERY PROCEDURE UNLISTED         2 breast bxs    CATH LEFT HEART CATHETERIZATION   3/25/2008     [facility] coronary angiography and L ventriculography via the R femoral artery    CHOLECYSTECTOMY   2013    COLONOSCOPY   05  [facility] ([facility])     Iron def anemia: internal hemorrhoids; random biopsies show melanosis coli    COLONOSCOPY   11  [facility]     Change in bowel habits, bloating: mod distal small bowel distention (random bx neg) but normal ileocolonic anastamosis, int hemorrhoids, no evidence of volvulus or obstruction; next routine colonoscopy in 10 years    COLONOSCOPY   2018    FLEX SIG   2018     MAC: band like structure on BLUE but no rectal masses on flex sig, mucosa normal, surgical anastomosis normal, int hemorrhoids    HERNIA SURGERY   2013     hernia repair    IMPLANT LEFT       IMPLANT RIGHT           1885,     OTHER   2014     colposcopy with Dr. Bromberger    OTHER SURGICAL HISTORY   3/16/09  [facility]     EGD (epig pain, early satiety): Normal    OTHER SURGICAL HISTORY   6-28-10     flow US ,dr carpio    OTHER SURGICAL HISTORY   7-8-10     Cysto- Dr. Carpio    OTHER SURGICAL HISTORY   8/3/05  [facility] ([facility])     EGD: Duodenitis, gastritis; duodenal biopsies show duodenitis but no obvious sprue    OTHER SURGICAL HISTORY         EXPLORATORY LAPAROTOMY, with bowel resection    OTHER SURGICAL HISTORY   2009     COLECTOMY, subtotal, for volvulus    OTHER SURGICAL HISTORY        ileus surgery    OTHER SURGICAL HISTORY   13     Flow US, Cysto - Dr. Carpio     OTHER SURGICAL HISTORY   2013     intestional surgery/hernia repear    OTHER SURGICAL HISTORY   3/2017     left ankle fx repair    OTHER SURGICAL HISTORY   17     Flow US  Dr. Mckeon    RECTAL MANOMETRY STUDY - EXTERNAL   2018     Type 2 dyssynergia, diminished squeeze, delay in  first sensation, urge and discomfort, RAIR absent, unable to expel balloon in three min, refer to biofeedback    SKIN SURGERY   2-25-15     BCC-nodular to left upper lip/MOHS by AB    SPECIAL SERVICE OR REPORT         Partial thyroidectomy    SPECIAL SERVICE OR REPORT         Breast augmentation    SPECIAL SERVICE OR REPORT         Sinus surgery    THYROID LOBECTOMY,UNILAT   5/19/1994     total R thyroid lobectomy, L subtotal thyroidectomy, autotransplantation of R inferior parathyroid gland, resection of Delphian lymph node superior to isthmus    UPPER GI ENDOSCOPY,BIOPSY   3/16/09     Performed by SRINIVASA WHITE at [facility] [place], [facility]                Family History   Problem Relation Age of Onset    Gastro-Intestinal Disorder Father           Constipation    Neurological Disorder Father           Parkinson's    Hypertension Father      Lipids Father           hyperlipidemia    Other (Alzheimer's Disease) Father      Dementia Father      Other Father           alzheimers    Neurological Disorder Mother           ALS    Other (Janet Gehrig Disease) Mother      Other Mother      Gastro-Intestinal Disorder Son           Chronic diarrhea    Other (Sigmoid volvulus) Sister      Other (Migraine HA) Son      Arthritis Maternal Grandfather      Diabetes Other           pat uncle    Heart Disease Other           grandparent    Breast Cancer Paternal Grandmother           age unknown    Cancer Paternal Grandmother           REVIEW OF SYSTEMS:     A 10-point comprehensive review of systems was negative with pertinent items noted in HPI.            Objective:  GENERAL: well developed, well nourished, in no apparent distress  HEENT: atraumatic, normocephalic  LUNGS: normal respiratory motion without distress  CARDIO:NA  Abd: Soft, non-tender, non-distended  : No SPT or CVAT                 Lab Results   Component Value Date     GLUCOSEDIP Negative 09/18/2023     BILIRUBIN Negative 09/18/2023     KETONESDIP Negative  09/18/2023     SPECGRAVITY 1.015 09/18/2023     BLOODU Negative 09/18/2023     PHURINE 6.0 09/18/2023     PROTEINDIP Negative 09/18/2023     UROBILIN 0.2 09/18/2023     NITRITE Negative 09/18/2023     LEUKOCYTES Trace (A) 09/18/2023            PVR: 5ml           Assessment & Plan:  67yo F  Diagnoses and all orders for this visit:     Incomplete emptying of bladder     Urgency-frequency syndrome     Nocturia      Plan:  Has tried flomax, cardura, PFPT and Myrbetriq. Not candidate for Ach given feeling of incomplete emptying is main symptoms  Will schedule interstim PNE  PVR low     Addendum:  Had PNE but <50% improvement so here today for stage I        Fauzia Joaquin, DO     The patient indicates understanding of these issues and agrees to the plan.

## 2024-07-15 NOTE — ANESTHESIA PREPROCEDURE EVALUATION
Anesthesia PreOp Note    HPI:     Frieda Dumont is a 69 year old female who presents for preoperative consultation requested by: Fauzia Joaquin DO    Date of Surgery: 7/15/2024    Procedure(s):  [employer]tr[employer] stage 1 sacral neuromodulation lead placement  Indication: Urgency incontinence, urgency/frequency syndrome    Relevant Problems   No relevant active problems       NPO:                         History Review:  Patient Active Problem List    Diagnosis Date Noted    Current mild episode of major depressive disorder without prior episode ([facility]) 01/12/2022    Stage 3a chronic kidney disease (HCC) 09/14/2021    Pulmonary embolism (HCC) 09/03/2021    Chronic facial pain 05/12/2021    Fibromuscular dysplasia of carotid artery ([facility]) 07/26/2018    Varicose vein of leg 04/12/2018    Primary insomnia 09/10/2015    Vitamin D deficiency 07/09/2015    Osteopenia 07/09/2015    Thyroid nodule 07/09/2015    Basal cell carcinoma of lip 05/11/2015    Fibromyalgia     Chronic interstitial cystitis     Osteoarthritis     NSAID long-term use     Migraines     Chronic LBP     Colonic dysmotility 11/11/2011    Trigeminal neuropathy 10/15/2008       Past Medical History:    B12 deficiency    Back problem    Bacterial overgrowth syndrome    Basal cell carcinoma of lip    Bowel obstruction ([facility])    Bursitis    iliotibial     Calculus of kidney    Cardiomyopathy ([facility])    s/p cath (3/25/2008)-normal coronary arteries    Cecal volvulus ([facility])    Chronic constipation    Chronic interstitial cystitis    Chronic interstitial cystitis    Chronic LBP    Closed fracture of one rib of left side with routine healing    Colonic dysmotility    chronic    Disorder of thyroid    Dyssynergia    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    Environmental allergies    Fibromyalgia    Folate deficiency    History of Anorexia    History of TMJ disorder    HPV in female    ASCUS 2014 w/HPV+, cert letter sent    Hypersomnia    Hypothyroidism    Insomnia    Internal  hemorrhoids    Keratoconjunctivitis sicca not specified as Sjogren's    Lesion of brain    B/L basal ganglia lesions    Migraines    Myofascial pain    SACHIN (obstructive sleep apnea) AHI 14    Osteopenia    Osteoporosis    Peripheral neuropathy    Pneumatosis cystoides intestinalis    Pulmonary embolism (HCC)    Reflux    Rhabdomyolysis    RLS (restless legs syndrome)    Sciatica of left side    Seizure disorder (HCC)    when she was 6mos old    Sepsis, unspecified    pneumonia sepsis    Sicca (HCC)    Sleep apnea    uses CPAP    Sleep apnea, obstructive    AHI 14 Supine 36 Positional, CPAP 5 cm H2O/ Tap III at 2 mm    Stage 3a chronic kidney disease (HCC)    Thyroid nodule    \"s/p resection\"    Trigeminal neuralgia    Vegetarian    Vitamin D deficiency    On IM vitamin D q 6 mos w/endocrine       Past Surgical History:   Procedure Laterality Date    Appendectomy  ?    Benign biopsy left      Benign biopsy right      unknown date    Breast surgery procedure unlisted      2 breast bxs    Cath left heart catheterization  2008    [facility] coronary angiography and L ventriculography via the R femoral artery    Cholecystectomy  2013    Colectomy      Colonoscopy  05  [facility] ([facility])    Iron def anemia: internal hemorrhoids; random biopsies show melanosis coli    Colonoscopy  11  [facility]    Change in bowel habits, bloating: mod distal small bowel distention (random bx neg) but normal ileocolonic anastamosis, int hemorrhoids, no evidence of volvulus or obstruction; next routine colonoscopy in 10 years    Colonoscopy      Flex sig  2018    MAC: band like structure on BLUE but no rectal masses on flex sig, mucosa normal, surgical anastomosis normal, int hemorrhoids    Hernia surgery  2013    hernia repair    Implant left      Implant right        1885,     Other  2014    colposcopy with Dr. Bromberger    Other surgical history  3/16/09  [facility]    EGD (epig pain, early satiety): Normal     Other surgical history  06/28/2010    flow US ,dr carpio    Other surgical history  07/08/2010    Cysto- Dr. Carpio    Other surgical history  8/3/05  [facility] ([facility])    EGD: Duodenitis, gastritis; duodenal biopsies show duodenitis but no obvious sprue    Other surgical history      EXPLORATORY LAPAROTOMY, with bowel resection    Other surgical history  05/2009    COLECTOMY, subtotal, for volvulus    Other surgical history  2009    ileus surgery    Other surgical history  08/01/2013    Flow US, Cysto - Dr. Carpio     Other surgical history  02/2013    intestional surgery/hernia repear    Other surgical history  03/2017    left ankle fx repair    Other surgical history  06/23/2017    Flow US  Dr. Mckeon    Rectal manometry study - external  12/2018    Type 2 dyssynergia, diminished squeeze, delay in first sensation, urge and discomfort, RAIR absent, unable to expel balloon in three min, refer to biofeedback    Skin surgery  02/25/2015    BCC-nodular to left upper lip/MOHS by AB    Special service or report      Partial thyroidectomy    Special service or report      Breast augmentation    Special service or report      Sinus surgery    Thyroid lobectomy,unilat  05/19/1994    total R thyroid lobectomy, L subtotal thyroidectomy, autotransplantation of R inferior parathyroid gland, resection of Delphian lymph node superior to isthmus    Upper gi endoscopy,biopsy  03/16/2009    Performed by SRINIVASA WHITE at [facility] [place] [facility] [place], [facility]       Medications Prior to Admission   Medication Sig Dispense Refill Last Dose    gabapentin 600 MG Oral Tab Take 2 tablets (1,200 mg total) by mouth 3 (three) times daily.       baclofen 20 MG Oral Tab Take 35 mg by mouth 2 (two) times daily. 10 mg on the night       clonazePAM 2 MG Oral Tab Take 1.5 tablets (3 mg total) by mouth in the morning and 1.5 tablets (3 mg total) before bedtime. Takes  2mg at HS.       levothyroxine 75 MCG Oral Tab Take 1 tablet (75 mcg total) by mouth before  breakfast.       PAROXETINE HCL 40 MG Oral Tab TAKE 0.5 TABLETS (20 MG TOTAL) BY MOUTH 2 (TWO) TIMES DAILY. (Patient taking differently: Take 0.5 tablets (20 mg total) by mouth at bedtime.) 90 tablet 3     Fluocinolone Acetonide Scalp 0.01 % External Oil Apply a thin layer to aa of scalp at bedtime until improved, then prn flares 118.28 mL 2     alendronate (FOSAMAX) 70 MG Oral Tab Take 1 tablet (70 mg total) by mouth every 7 days. 12 tablet 3     Clobetasol Propionate 0.05 % External Solution APPLY TO SCALP TWICE A DAY X 1-2 WEEKS, THEN TAPER TO AS NEEDED FOR FLARES 50 mL 0     triamcinolone acetonide 0.1 % External Cream APPLY TO AFFECTED AREA TWICE A DAY X1-2 WEEKS, THEN TAPER TO 1-2X WEEKLY AS NEEDED 80 g 1     KLOR-CON M20 20 MEQ Oral Tab CR TAKE 2 TABLETS BY MOUTH EVERY  tablet 0     Ketoconazole 2 % External Shampoo USE TO CLEANSE SCALP DAILY 120 mL 11     Nabumetone 500 MG Oral Tab Take 1 tablet (500 mg total) by mouth 2 (two) times daily. (Patient taking differently: Take 1 tablet (500 mg total) by mouth daily as needed.) 180 tablet 3 6/21/2024    Cholecalciferol (VITAMIN D3) 75 MCG (3000 UT) Oral Tab Take 1 tablet by mouth daily.       FLUTICASONE PROPIONATE 50 MCG/ACT Nasal Suspension SPRAY 2 SPRAYS INTO EACH NOSTRIL EVERY DAY 3 Bottle 1     Melatonin 1 MG Oral Cap Take 5 capsules (5 mg total) by mouth at bedtime.       acetaminophen 500 MG Oral Tab Take 2 tablets (1,000 mg total) by mouth every 6 (six) hours as needed for Pain.       Saline 0.2 % Nasal Solution by Nasal route nightly.         DAILY MULTIVITAMIN OR 1 po qd        Current Facility-Administered Medications Ordered in Epic   Medication Dose Route Frequency Provider Last Rate Last Admin    acetaminophen (Tylenol Extra Strength) tab 1,000 mg  1,000 mg Oral Once Fauzia Joaquin,         sodium chloride 0.9% infusion   Intravenous Continuous Fauzia Joaquin DO        ceFAZolin (Ancef) 2g in 10mL IV syringe premix  2 g Intravenous Once  Fauzia Joaquin,          No current [facility]-ordered outpatient medications on file.       Allergies   Allergen Reactions    Carbamazepine ITCHING    Iron ANAPHYLAXIS     IV    Pneumovax [Pneumococcal Polysaccharides] SWELLING     Under right eye, limited    Tramadol SHORTNESS OF BREATH    Ultram [Tramadol Hcl] ASTHMA    Lactated Ringers RASH    Tegretol RASH       Family History   Problem Relation Age of Onset    Gastro-Intestinal Disorder Father         Constipation    Neurological Disorder Father         Parkinson's    Hypertension Father     Lipids Father         hyperlipidemia    Other (Alzheimer's Disease) Father     Dementia Father     Other Father         alzheimers    Neurological Disorder Mother         ALS    Other (Janet Gehrig Disease) Mother     Other Mother     Gastro-Intestinal Disorder Son         Chronic diarrhea    Other (Sigmoid volvulus) Sister     Other (Migraine HA) Son     Arthritis Maternal Grandfather     Diabetes Other         pat uncle    Heart Disease Other         grandparent    Breast Cancer Paternal Grandmother         age unknown    Cancer Paternal Grandmother      Social History     Socioeconomic History    Marital status:    Occupational History    Occupation: Disabled    Occupation: Nutritionist   Tobacco Use    Smoking status: Former     Current packs/day: 0.00     Types: Cigarettes     Start date: 3/11/2015     Quit date: 3/11/2020     Years since quittin.3    Smokeless tobacco: Never    Tobacco comments:     last smoked 2020   Vaping Use    Vaping status: Never Used   Substance and Sexual Activity    Alcohol use: Not Currently     Alcohol/week: 0.0 standard drinks of alcohol    Drug use: No    Sexual activity: Not Currently   Other Topics Concern    Caffeine Concern Yes     Comment: 1-2 times daily (tea & pop)    Stress Concern Yes     Comment: much worry    Weight Concern No    Special Diet Yes     Comment: lactovegetarian    Exercise Yes    Seat Belt Yes        Available pre-op labs reviewed.             Vital Signs:  Body mass index is 17.63 kg/m².   height is 1.829 m (6') and weight is 59 kg (130 lb).   Vitals:    07/05/24 1737   Weight: 59 kg (130 lb)   Height: 1.829 m (6')        Anesthesia Evaluation      Airway   Mallampati: II  TM distance: >3 FB  Neck ROM: full  Dental      Pulmonary - normal exam   (+) sleep apnea  Cardiovascular - normal exam    Neuro/Psych    (+)  neuromuscular disease,        GI/Hepatic/Renal      Endo/Other    Abdominal  - normal exam                 Anesthesia Plan:   ASA:  1  Plan:   MAC  Plan Comments: Anesthesia plan was discussed with the patient, going through the rationale, expectations, benefits and risks namely injury to lips, teeth, gyms or corneas, risks of an allergic reaction, risk of awareness or recall of intra-operative events, risk of prolonged intubation and ICU admission, risks of kidney failure, risk of coronary events or dysrhythmias, risk of cerebrovascular events or stroke and on rare occasions death.         I have informed Frieda Dumont and/or legal guardian or family member of the nature of the anesthetic plan, benefits, risks including possible dental damage if relevant, major complications, and any alternative forms of anesthetic management.   All of the patient's questions were answered to the best of my ability. The patient desires the anesthetic management as planned.  Rah Moreno MD  7/15/2024 10:17 AM  Present on Admission:  **None**

## 2024-07-15 NOTE — OPERATIVE REPORT
[facility]  part of [facility]    Operative Note         Frieda Dumont Location: OR   [facility] [id number] MRN [id number]   Admission Date 7/15/2024 Operation Date 7/15/2024   Attending Physician Fauzia Joaquin DO       Patient Name: Frieda Dumont     Preoperative Diagnosis: Urgency incontinence, urgency/frequency syndrome     Postoperative Diagnosis: Urgency incontinence, urgency/frequency syndrome     Procedure(s):  Medtronic stage 1 sacral neuromodulation lead placement RIGHT    Primary Surgeon: Fauzia Joaquin DO           Anesthesia: MAC     Specimen: * No specimens in log *     Estimated Blood Loss: 10ml  Complications: none      Operative Summary:      PROCEDURE AND ANESTHESIA TYPE:        1. InterStim Peripheral Nerve Stimulation Stage 1- Incision and implantation of tined quadripolar lead electrodes into right S3 Foramen (45768)              2. Fluoroscopic guidance for needle placement (13583)    OPERATIVE INDICATIONS:   This patient has a history of Refractory Urinary Urgency/Frequency, Refractory Urgency Incontinence.  This has been substantiated on voiding diaries preoperatively, and the patient has tried and failed anticholinergic medication and behavioral modifications.  Patient previously had PNE but did not have 50% improvement in her symptoms so is appropriately here today for stage I procedure.  The procedure, materials, personnel, indications, alternatives, risks, and benefits were described in great detail.  Consent was obtained.  The patient now presents for definitive therapy.    PROCEDURE AND TECHNIQUE:     The patient was brought to the operative suite, properly identified utilizing two patient identifiers and placed in prone position per OR protocol.  The safety checklist was performed.  Preoperative antibiotics of Ancef and MAC anesthesia were administered.  The patient was prepped and draped in usual sterile fashion.  Using fluoroscopic localization, the  approximate locations of the S2, S3 and S4 neural foramina were identified.  A timeout per protocol was then carried out.  Local injection of 1% lidocaine was administered.  The spinal needle was then passed and proper needle position was confirmed with fluoroscopy and direct observation of larry, and observation of plantar flexion of the great toe.    The patient's right S3 neural foramen had the following response:    Lead 1 Location:      S3: ____ Left      __X__ Right             MOTOR RESPONSE:  Electrode 0 1 2 3   Anal Larry + + + +   Flexion of Great Toe + + + +   Amplitude (mA) 1 1 1 1.5       We placed electrodes 0, 1, 2, 3 leads below and 0, 1, 2, 3 above the sacral plate.  The tunneling device was then tunneled to the right side and the percutaneous extension wire was attached to the device and pulled back through the incision after making a small incision with the knife. The lead was inserted into the boot of the extension wire and secured with 1 screw. Hemostasis was achieved and the incision was copiously irrigated and closed in a 2 layer fashion with 2-0 Vicryl followed by 4-0 Monocryl subcuticular closure.  Counts were correct.  Incisions were cleaned and dried.  Dermabond and 4x4s were placed over the incision and under the extension followed by Tegaderms.  The patient was awakened from sedation and transferred to the PACU in stable condition.  Using the external test stimulator, the patient was programmed to the lead of optimum sensation and given instructions on utilizing the external test stimulator prior to discharge.  A urinary diary will be kept until re-evaluation to discuss results of this test stimulation.      Dr. Fauzia Joaquin                Implants:   Implant Name Type Inv. Item Serial No.  Lot No. LRB No. Used Action   EXTENSION NEUROSTIM [id number] PERC DST - SNA  EXTENSION NEUROSTIM [id number] PERC DST NA [employer]  [id number] N/A 1 Implanted   LEAD NEUROSTIM  L28CM DIA1.27MM 4 IN LN - SN/A  LEAD NEUROSTIM L28CM DIA1.27MM 4 IN LN N/A Medtronic [employer] WD N/A N/A 1 Implanted        Drains: none     Condition: stable to PACU       Fauzia Joaquin DO

## 2024-07-23 RX ORDER — VITAMIN B COMPLEX
1 CAPSULE ORAL EVERY OTHER DAY
COMMUNITY

## 2024-07-23 RX ORDER — DOXAZOSIN MESYLATE 4 MG/1
4 TABLET ORAL 2 TIMES DAILY
Status: ON HOLD | COMMUNITY
Start: 2024-02-16 | End: 2024-07-24

## 2024-07-23 RX ORDER — PHENOL 1.4 %
600 AEROSOL, SPRAY (ML) MUCOUS MEMBRANE DAILY
COMMUNITY

## 2024-07-23 NOTE — DISCHARGE INSTRUCTIONS
Post-Operative Information for Patients following Sacral Neuromodulation Stage I & Stage II: full implant or revision     Please call the office or do online: Cancel appointment with me for next week and make an appointment with Hansa Mcdermott in 1 month    INCISIONS  Showering and bathing:   It is okay to shower 24 hours after your surgery.   Avoid baths/swimming/hot tubs for 4 weeks or until your incisions completely heal. Keeping incisions underwater can affect the healing process.   Your incisions are closed with absorbable sutures and skin glue or surgical tape on top. All sutures used are dissolvable and typically will fall out by 6 weeks, if they have not fallen out by the time of your follow up visit we will remove them.     You may let soapy water run over the incision. There is no need to scrub the incision. Allow the skin glue or surgical tape to fall off on its own.     ACTIVITIES  If you had a battery implant or battery change only you may resume your normal activities in 48 hours.   Do not lift heavy items, over 10 pounds for 2 weeks  Avoid activities where you bend at the waist for 2 weeks     DIET  You may resume a normal diet without restrictions    PAIN CONTROL  Incisional pain is normal. Rest and take your pain medication before the pain becomes too intense.  First step for your pain control is to use over the counter Tylenol and ibuprofen  For additional pain control, you may have been prescribed a narcotic~ pain medication, such as Oxycodone, Percocet, Norco, Tylenol #3, Valium, Tramadol, or Hydrocodone.   Use narcotic pain medications for severe pain not improved by Tylenol and ibuprofen in the first few days after surgery. Transition to only Tylenol or ibuprofen as soon as possible.      PAIN MEDICATION INFORMATION:  *The maximum amount of Tylenol you can take in a 24 hour period is 4000 mg. Taking over this amount could cause liver damage. Make sure that if you are taking additional narcotic  pain medication it does not contain acetaminophen, the active ingredient in Tylenol. lf it does, you must account for in your daily total. For example: Norco or Percocet typically contain 325mg of Tylenol. Wean this medication as tolerated. Tylenol is processed by your liver. Do not take Tylenol if you are have a history of liver failure. Do not mix with alcohol.   **You may take 200-800mg of ibuprofen (Advil Æ) every 8 hours as needed for pain. This will help with pain from inflammation (swelling). Ibuprofen is processed by your kidneys. If you have any history of kidney disease you should avoid ibuprofen and all types of NSAIDS (AleveÆ, MotrinÆ, AdvilÆ). Please take Ibuprofen with food. Avoid if you have a history of stomach ulcers. If you are taking the maximum dose of ibuprofen (800mg every 8 hours), reduce this amount to 200-400mg ibuprofen every 8 hours as your pain improves.   ~Remember that narcotics are addictive, sedating, and constipating.  You should be taking a stool softener once or twice a day while on this medication. If you are prescribed narcotic pain medication, please use the medication as instructed. Do not use more than instructed. Do not take this medication with alcohol, sedatives, anti-anxiety medications, or sleep aids.  ~It is important to keep narcotic pills safely stored, as it is at great risk of being stolen or misused by family, friends, or even strangers. Please be sure to dispose of leftover pain medication after you have recovered. You may dispose of unused narcotic medications in the trash with an unpleasant substance such as coffee grounds or cat litter. You can also check FDA.gov to assess which medications can be safely flushed down the toilet.      INFECTION  Serious infections with this procedure are a rare occurrence  The likelihood of infection increases anytime the skin is opened.   Please take all of the prescribed antibiotics for prevention  Symptoms suggesting infection  include:   fever > 101.5 ?F (38.5?C)  foul smelling or yellow drainage at the incision site  pain, redness, and swelling.   If you experience these symptoms, contact your physician.    FOLLOW UP  Our office will call to schedule a follow up appointment with your surgeon or one of our physician assistants in 3-4 weeks.   If you do not hear from our office regarding this appointment 1-2 business days after your surgery please contact the office.      If questions or concerns:   Call [phone] to reach your physician's office or send a [employer] message and either myself or one of my staff members will get back to you as soon as possible.     OR: Go to the nearest Emergency Room if you feel any signs of symptoms that warrant physician's immediate attention.     Fauzia Joaquin DO, [facility]  [phone]      [facility] INSTRU[facility]: POST-OP ANESTHESIA  The medication that you received for sedation or general anesthesia can last up to 24 hours. Your judgment and reflexes may be altered, even if you feel like your normal self.      We Recommend:   Do not drive any motor vehicle or bicycle   Avoid mowing the lawn, playing sports, or working with power tools/applicances (power saws, electric knives or mixers)   That you have someone stay with you on your first night home   Do not drink alcohol or take sleeping pills or tranquilizers   Do not sign legal documents within 24 hours of your procedure   If you had a nerve block for your surgery, take extra care not to put any pressure on your arm or hand for 24 hours    It is normal:  For you to have a sore throat if you had a breathing tube during surgery (while you were asleep!). The sore throat should get better within 48 hours. You can gargle with warm salt water (1/2 tsp in 4 oz warm water) or use a throat lozenge for comfort  To feel muscle aches or soreness especially in the abdomen, chest or neck. The achy feeling should go away in the next 24 hours  To  feel weak, sleepy or \"wiped out\". Your should start feeling better in the next 24 hours.   To experience mild discomforts such as sore lip or tongue, headache, cramps, gas pains or a bloated feeling in your abdomen.   To experience mild back pain or soreness for a day or two if you had spinal or epidural anesthesia.   If you had laparoscopic surgery, to feel shoulder pain or discomfort on the day of surgery.   For some patients to have nausea after surgery/anesthesia    If you feel nausea or experience vomiting:   Try to move around less.   Eat less than usual or drink only liquids until the next morning   Nausea should resolve in about 24 hours    If you have a problem when you are at home:    Call your surgeons office   Discharge Instructions: After Your Surgery  You’ve just had surgery. During surgery, you were given medicine called anesthesia to keep you relaxed and free of pain. After surgery, you may have some pain or nausea. This is common. Here are some tips for feeling better and getting well after surgery.   Going home  Your healthcare provider will show you how to take care of yourself when you go home. They'll also answer your questions. Have an adult family member or friend drive you home. For the first 24 hours after your surgery:   Don't drive or use heavy equipment.  Don't make important decisions or sign legal papers.  Take medicines as directed.  Don't drink alcohol.  Have someone stay with you, if needed. They can watch for problems and help keep you safe.  Be sure to go to all follow-up visits with your healthcare provider. And rest after your surgery for as long as your provider tells you to.   Coping with pain  If you have pain after surgery, pain medicine will help you feel better. Take it as directed, before pain becomes severe. Also, ask your healthcare provider or pharmacist about other ways to control pain. This might be with heat, ice, or relaxation. And follow any other instructions your  surgeon or nurse gives you.      Stay on schedule with your medicine.     Tips for taking pain medicine  To get the best relief possible, remember these points:   Pain medicines can upset your stomach. Taking them with a little food may help.  Most pain relievers taken by mouth need at least 20 to 30 minutes to start to work.  Don't wait till your pain becomes severe before you take your medicine. Try to time your medicine so that you can take it before starting an activity. This might be before you get dressed, go for a walk, or sit down for dinner.  Constipation is a common side effect of some pain medicines. Call your healthcare provider before taking any medicines such as laxatives or stool softeners to help ease constipation. Also ask if you should skip any foods. Drinking lots of fluids and eating foods such as fruits and vegetables that are high in fiber can also help. Remember, don't take laxatives unless your surgeon has prescribed them.  Drinking alcohol and taking pain medicine can cause dizziness and slow your breathing. It can even be deadly. Don't drink alcohol while taking pain medicine.  Pain medicine can make you react more slowly to things. Don't drive or run machinery while taking pain medicine.  Your healthcare provider may tell you to take acetaminophen to help ease your pain. Ask them how much you're supposed to take each day. Acetaminophen or other pain relievers may interact with your prescription medicines or other over-the-counter (OTC) medicines. Some prescription medicines have acetaminophen and other ingredients in them. Using both prescription and OTC acetaminophen for pain can cause you to accidentally overdose. Read the labels on your OTC medicines with care. This will help you to clearly know the list of ingredients, how much to take, and any warnings. It may also help you not take too much acetaminophen. If you have questions or don't understand the information, ask your pharmacist  or healthcare provider to explain it to you before you take the OTC medicine.   Managing nausea  Some people have an upset stomach (nausea) after surgery. This is often because of anesthesia, pain, or pain medicine, less movement of food in the stomach, or the stress of surgery. These tips will help you handle nausea and eat healthy foods as you get better. If you were on a special food plan before surgery, ask your healthcare provider if you should follow it while you get better. Check with your provider on how your eating should progress. It may depend on the surgery you had. These general tips may help:   Don't push yourself to eat. Your body will tell you when to eat and how much.  Start off with clear liquids and soup. They're easier to digest.  Next try semi-solid foods as you feel ready. These include mashed potatoes, applesauce, and gelatin.  Slowly move to solid foods. Don’t eat fatty, rich, or spicy foods at first.  Don't force yourself to have 3 large meals a day. Instead eat smaller amounts more often.  Take pain medicines with a small amount of solid food, such as crackers or toast. This helps prevent nausea.  When to call your healthcare provider  Call your healthcare provider right away if you have any of these:   You still have too much pain, or the pain gets worse, after taking the medicine. The medicine may not be strong enough. Or there may be a complication from the surgery.  You feel too sleepy, dizzy, or groggy. The medicine may be too strong.  Side effects such as nausea or vomiting. Your healthcare provider may advise taking other medicines to .  Skin changes such as rash, itching, or hives. This may mean you have an allergic reaction. Your provider may advise taking other medicines.  The incision looks different (for instance, part of it opens up).  Bleeding or fluid leaking from the incision site, and weren't told to expect that.  Fever of 100.4°F (38°C) or higher, or as directed by your  provider.  Call 911  Call 911 right away if you have:   Trouble breathing  Facial swelling    If you have obstructive sleep apnea   You were given anesthesia medicine during surgery to keep you comfortable and free of pain. After surgery, you may have more apnea spells because of this medicine and other medicines you were given. The spells may last longer than normal.    At home:  Keep using the continuous positive airway pressure (CPAP) device when you sleep. Unless your healthcare provider tells you not to, use it when you sleep, day or night. CPAP is a common device used to treat obstructive sleep apnea.  Talk with your provider before taking any pain medicine, muscle relaxants, or sedatives. Your provider will tell you about the possible dangers of taking these medicines.  Contact your provider if your sleeping changes a lot even when taking medicines as directed.  Yolanda last reviewed this educational content on 10/1/2021  © 5287-6325 [employer]. All rights reserved. This information is not intended as a substitute for professional medical care. Always follow your healthcare professional's instructions.

## 2024-07-24 ENCOUNTER — ANESTHESIA (OUTPATIENT)
Dept: SURGERY | Facility: HOSPITAL | Age: 69
End: 2024-07-24
Payer: MEDICARE

## 2024-07-24 ENCOUNTER — ANESTHESIA EVENT (OUTPATIENT)
Dept: SURGERY | Facility: HOSPITAL | Age: 69
End: 2024-07-24
Payer: MEDICARE

## 2024-07-24 ENCOUNTER — HOSPITAL ENCOUNTER (OUTPATIENT)
Facility: HOSPITAL | Age: 69
Setting detail: HOSPITAL OUTPATIENT SURGERY
Discharge: HOME | End: 2024-07-24
Attending: STUDENT IN AN ORGANIZED HEALTH CARE EDUCATION/TRAINING PROGRAM | Admitting: STUDENT IN AN ORGANIZED HEALTH CARE EDUCATION/TRAINING PROGRAM
Payer: MEDICARE

## 2024-07-24 ENCOUNTER — APPOINTMENT (OUTPATIENT)
Dept: GENERAL RADIOLOGY | Facility: HOSPITAL | Age: 69
End: 2024-07-24
Attending: STUDENT IN AN ORGANIZED HEALTH CARE EDUCATION/TRAINING PROGRAM
Payer: MEDICARE

## 2024-07-24 VITALS
DIASTOLIC BLOOD PRESSURE: 57 MMHG | BODY MASS INDEX: 18.15 KG/M2 | HEIGHT: 72 IN | HEART RATE: 56 BPM | OXYGEN SATURATION: 96 % | SYSTOLIC BLOOD PRESSURE: 101 MMHG | TEMPERATURE: 98 F | RESPIRATION RATE: 16 BRPM | WEIGHT: 134 LBS

## 2024-07-24 PROCEDURE — 0JH70MZ INSERTION OF STIMULATOR GENERATOR INTO BACK SUBCUTANEOUS TISSUE AND FASCIA, OPEN APPROACH: ICD-10-PCS | Performed by: STUDENT IN AN ORGANIZED HEALTH CARE EDUCATION/TRAINING PROGRAM

## 2024-07-24 DEVICE — SYSTEM NEUROSTIM H44XL51MM 22GM N RECHRG INCL: Type: IMPLANTABLE DEVICE | Site: BACK | Status: FUNCTIONAL

## 2024-07-24 RX ORDER — HYDROMORPHONE HYDROCHLORIDE 1 MG/ML
0.2 INJECTION, SOLUTION INTRAMUSCULAR; INTRAVENOUS; SUBCUTANEOUS EVERY 5 MIN PRN
Status: DISCONTINUED | OUTPATIENT
Start: 2024-07-24 | End: 2024-07-24

## 2024-07-24 RX ORDER — SODIUM CHLORIDE, SODIUM LACTATE, POTASSIUM CHLORIDE, CALCIUM CHLORIDE 600; 310; 30; 20 MG/100ML; MG/100ML; MG/100ML; MG/100ML
INJECTION, SOLUTION INTRAVENOUS CONTINUOUS
Status: DISCONTINUED | OUTPATIENT
Start: 2024-07-24 | End: 2024-07-24

## 2024-07-24 RX ORDER — SULFAMETHOXAZOLE AND TRIMETHOPRIM 800; 160 MG/1; MG/1
1 TABLET ORAL 2 TIMES DAILY
Qty: 6 TABLET | Refills: 0 | Status: SHIPPED | OUTPATIENT
Start: 2024-07-24 | End: 2024-07-27

## 2024-07-24 RX ORDER — HYDROMORPHONE HYDROCHLORIDE 1 MG/ML
0.4 INJECTION, SOLUTION INTRAMUSCULAR; INTRAVENOUS; SUBCUTANEOUS EVERY 5 MIN PRN
Status: DISCONTINUED | OUTPATIENT
Start: 2024-07-24 | End: 2024-07-24

## 2024-07-24 RX ORDER — ONDANSETRON 2 MG/ML
4 INJECTION INTRAMUSCULAR; INTRAVENOUS EVERY 6 HOURS PRN
Status: DISCONTINUED | OUTPATIENT
Start: 2024-07-24 | End: 2024-07-24

## 2024-07-24 RX ORDER — SODIUM CHLORIDE 9 MG/ML
INJECTION, SOLUTION INTRAVENOUS CONTINUOUS
Status: DISCONTINUED | OUTPATIENT
Start: 2024-07-24 | End: 2024-07-24

## 2024-07-24 RX ORDER — NALOXONE HYDROCHLORIDE 0.4 MG/ML
80 INJECTION, SOLUTION INTRAMUSCULAR; INTRAVENOUS; SUBCUTANEOUS AS NEEDED
Status: DISCONTINUED | OUTPATIENT
Start: 2024-07-24 | End: 2024-07-24

## 2024-07-24 RX ORDER — LIDOCAINE HYDROCHLORIDE 10 MG/ML
INJECTION, SOLUTION EPIDURAL; INFILTRATION; INTRACAUDAL; PERINEURAL AS NEEDED
Status: DISCONTINUED | OUTPATIENT
Start: 2024-07-24 | End: 2024-07-24 | Stop reason: HOSPADM

## 2024-07-24 RX ORDER — METOCLOPRAMIDE HYDROCHLORIDE 5 MG/ML
10 INJECTION INTRAMUSCULAR; INTRAVENOUS EVERY 8 HOURS PRN
Status: DISCONTINUED | OUTPATIENT
Start: 2024-07-24 | End: 2024-07-24

## 2024-07-24 RX ORDER — HYDROMORPHONE HYDROCHLORIDE 1 MG/ML
0.6 INJECTION, SOLUTION INTRAMUSCULAR; INTRAVENOUS; SUBCUTANEOUS EVERY 5 MIN PRN
Status: DISCONTINUED | OUTPATIENT
Start: 2024-07-24 | End: 2024-07-24

## 2024-07-24 RX ORDER — SODIUM CHLORIDE, SODIUM LACTATE, POTASSIUM CHLORIDE, CALCIUM CHLORIDE 600; 310; 30; 20 MG/100ML; MG/100ML; MG/100ML; MG/100ML
INJECTION, SOLUTION INTRAVENOUS CONTINUOUS PRN
Status: DISCONTINUED | OUTPATIENT
Start: 2024-07-24 | End: 2024-07-24

## 2024-07-24 RX ORDER — ACETAMINOPHEN 500 MG
1000 TABLET ORAL ONCE
Status: COMPLETED | OUTPATIENT
Start: 2024-07-24 | End: 2024-07-24

## 2024-07-24 RX ORDER — BUPIVACAINE HYDROCHLORIDE 5 MG/ML
INJECTION, SOLUTION EPIDURAL; INTRACAUDAL AS NEEDED
Status: DISCONTINUED | OUTPATIENT
Start: 2024-07-24 | End: 2024-07-24 | Stop reason: HOSPADM

## 2024-07-24 NOTE — ANESTHESIA PREPROCEDURE EVALUATION
Anesthesia PreOp Note    HPI:     Frieda Dumont is a 69 year old female who presents for preoperative consultation requested by: Fauzia Joaquin DO    Date of Surgery: 7/24/2024    Procedure(s):  Medtronic stage 2 battery implantation versus lead removal  Indication: Urgency incontinence, urgency/frequency syndrome    Relevant Problems   No relevant active problems       NPO:  Last Liquid Consumption Date: 07/24/24  Last Liquid Consumption Time: 0615 (water with meds)  Last Solid Consumption Date: 07/23/24  Last Solid Consumption Time: 2330  Last Liquid Consumption Date: 07/24/24          History Review:  Patient Active Problem List    Diagnosis Date Noted    Current mild episode of major depressive disorder without prior episode (HCC) 01/12/2022    Stage 3a chronic kidney disease ([facility]) 09/14/2021    Pulmonary embolism ([facility]) 09/03/2021    Chronic facial pain 05/12/2021    Fibromuscular dysplasia of carotid artery ([facility]) 07/26/2018    Varicose vein of leg 04/12/2018    Primary insomnia 09/10/2015    Vitamin D deficiency 07/09/2015    Osteopenia 07/09/2015    Thyroid nodule 07/09/2015    Basal cell carcinoma of lip 05/11/2015    Fibromyalgia     Chronic interstitial cystitis     Osteoarthritis     NSAID long-term use     Migraines     Chronic LBP     Colonic dysmotility 11/11/2011    Trigeminal neuropathy 10/15/2008       Past Medical History:    B12 deficiency    Back problem    Bacterial overgrowth syndrome    Basal cell carcinoma of lip    Bowel obstruction ([facility])    Bursitis    iliotibial     Calculus of kidney    Cardiomyopathy ([facility])    s/p cath (3/25/2008)-normal coronary arteries    Cecal volvulus ([facility])    Chronic constipation    Chronic interstitial cystitis    Chronic interstitial cystitis    Chronic LBP    Closed fracture of one rib of left side with routine healing    Colonic dysmotility    chronic    Disorder of thyroid    Dyssynergia    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    Environmental allergies     Fibromyalgia    Folate deficiency    History of Anorexia    History of blood transfusion    History of TMJ disorder    HPV in female    ASCUS 2014 w/HPV+, cert letter sent    Hypersomnia    Hypothyroidism    Insomnia    Internal hemorrhoids    Keratoconjunctivitis sicca not specified as Sjogren's    Lesion of brain    B/L basal ganglia lesions    Myofascial pain    SACHIN (obstructive sleep apnea) AHI 14    Osteopenia    Osteoporosis    Peripheral neuropathy    Pneumatosis cystoides intestinalis    Pulmonary embolism (HCC)    Reflux    Rhabdomyolysis    RLS (restless legs syndrome)    Sciatica of left side    Seizure disorder (HCC)    when she was 6mos old    Sepsis, unspecified    pneumonia sepsis    Sicca (HCC)    Sleep apnea    uses CPAP    Sleep apnea, obstructive    AHI 14 Supine 36 Positional, CPAP 5 cm H2O/ Tap III at 2 mm    Stage 3a chronic kidney disease (HCC)    Thyroid nodule    \"s/p resection\"    Trigeminal neuralgia    Vegetarian    Vitamin D deficiency    On IM vitamin D q 6 mos w/endocrine       Past Surgical History:   Procedure Laterality Date    Appendectomy  ?    Benign biopsy left  1987    Benign biopsy right      unknown date    Breast surgery procedure unlisted      2 breast bxs    Cath left heart catheterization  03/25/2008    [facility] coronary angiography and L ventriculography via the R femoral artery    Cholecystectomy  05/01/2013    Colectomy      x3    Colonoscopy  8/24/05  [facility] ([facility])    Iron def anemia: internal hemorrhoids; random biopsies show melanosis coli    Colonoscopy  9/1/11  [facility]    Change in bowel habits, bloating: mod distal small bowel distention (random bx neg) but normal ileocolonic anastamosis, int hemorrhoids, no evidence of volvulus or obstruction; next routine colonoscopy in 10 years    Colonoscopy  2018    Flex sig  09/2018    MAC: band like structure on BLUE but no rectal masses on flex sig, mucosa normal, surgical anastomosis normal, int hemorrhoids    Hernia surgery   2013    hernia repair    Implant left      Implant right        1885,     Other  2014    colposcopy with Dr. Bromberger    Other surgical history  3/16/09  [facility]    EGD (epig pain, early satiety): Normal    Other surgical history  2010    flow US ,dr carpio    Other surgical history  2010    Cysto- Dr. Carpio    Other surgical history  8/3/05  [facility] ([facility])    EGD: Duodenitis, gastritis; duodenal biopsies show duodenitis but no obvious sprue    Other surgical history      EXPLORATORY LAPAROTOMY, with bowel resection    Other surgical history  2009    COLECTOMY, subtotal, for volvulus    Other surgical history      ileus surgery    Other surgical history  2013    Flow US, Cysto - Dr. Carpio     Other surgical history  2013    intestional surgery/hernia repear    Other surgical history  2017    left ankle fx repair    Other surgical history  2017    Flow US  Dr. Mckeon    Rectal manometry study - external  2018    Type 2 dyssynergia, diminished squeeze, delay in first sensation, urge and discomfort, RAIR absent, unable to expel balloon in three min, refer to biofeedback    Skin surgery  2015    BCC-nodular to left upper lip/MOHS by AB    Special service or report      Partial thyroidectomy    Special service or report      Breast augmentation    Special service or report      Sinus surgery    Thyroid lobectomy,unilat  1994    total R thyroid lobectomy, L subtotal thyroidectomy, autotransplantation of R inferior parathyroid gland, resection of Delphian lymph node superior to isthmus    Upper gi endoscopy,biopsy  2009    Performed by SRINIVASA WHITE at [facility] [place], [facility]       Medications Prior to Admission   Medication Sig Dispense Refill Last Dose    Calcium Carbonate 600 MG Oral Tab Take 1 tablet (600 mg total) by mouth daily.   2024    Zinc Acetate 25 MG Oral Cap Take 1 tablet by mouth daily.   2024    B Complex Vitamins  (VITAMIN B COMPLEX) Oral Cap Take 1 tablet by mouth every other day.   7/22/2024    Ferrous Sulfate (FE-CAPS OR) Take 12 mg by mouth daily.   7/22/2024    MAGNESIUM GLYCINATE OR Take 1,000 mg by mouth daily.   7/22/2024    gabapentin 600 MG Oral Tab Take 2 tablets (1,200 mg total) by mouth 3 (three) times daily.   7/24/2024 at 0615    baclofen 20 MG Oral Tab Take 35 mg by mouth 2 (two) times daily. 10 mg on the night   7/23/2024 at 2000    clonazePAM 2 MG Oral Tab Take 1.5 tablets (3 mg total) by mouth in the morning and 1.5 tablets (3 mg total) before bedtime. Takes  2mg at HS.   7/24/2024 at 0615    levothyroxine 75 MCG Oral Tab Take 1 tablet (75 mcg total) by mouth before breakfast.   7/24/2024 at 0615    PAROXETINE HCL 40 MG Oral Tab TAKE 0.5 TABLETS (20 MG TOTAL) BY MOUTH 2 (TWO) TIMES DAILY. 90 tablet 3 7/23/2024 at 2000    alendronate (FOSAMAX) 70 MG Oral Tab Take 1 tablet (70 mg total) by mouth every 7 days. 12 tablet 3 Past Month    triamcinolone acetonide 0.1 % External Cream APPLY TO AFFECTED AREA TWICE A DAY X1-2 WEEKS, THEN TAPER TO 1-2X WEEKLY AS NEEDED 80 g 1 Past Week    KLOR-CON M20 20 MEQ Oral Tab CR TAKE 2 TABLETS BY MOUTH EVERY  tablet 0 7/23/2024 at 2000    Ketoconazole 2 % External Shampoo USE TO CLEANSE SCALP DAILY 120 mL 11 Past Week    FLUTICASONE PROPIONATE 50 MCG/ACT Nasal Suspension SPRAY 2 SPRAYS INTO EACH NOSTRIL EVERY DAY 3 Bottle 1 7/24/2024 at 0615    Melatonin 1 MG Oral Cap Take 5 capsules (5 mg total) by mouth at bedtime.   7/23/2024 at 2000    acetaminophen 500 MG Oral Tab Take 2 tablets (1,000 mg total) by mouth every 6 (six) hours as needed for Pain.   7/23/2024 at 2000    Saline 0.2 % Nasal Solution by Nasal route nightly.     7/24/2024 at 0615    DAILY MULTIVITAMIN OR Take 1 tablet by mouth daily.   7/22/2024    Fluocinolone Acetonide Scalp 0.01 % External Oil Apply a thin layer to aa of scalp at bedtime until improved, then prn flares 118.28 mL 2     Clobetasol  Propionate 0.05 % External Solution APPLY TO SCALP TWICE A DAY X 1-2 WEEKS, THEN TAPER TO AS NEEDED FOR FLARES 50 mL 0     Nabumetone 500 MG Oral Tab Take 1 tablet (500 mg total) by mouth 2 (two) times daily. 180 tablet 3     Cholecalciferol (VITAMIN D3) 75 MCG (3000 UT) Oral Tab Take 1 tablet by mouth daily. Patient takes 2000U   7/22/2024     Current Facility-Administered Medications Ordered in Epic   Medication Dose Route Frequency Provider Last Rate Last Admin    sodium chloride 0.9% infusion   Intravenous Continuous Fauzia Joaquin DO 20 mL/hr at 07/24/24 0752 New Bag at 07/24/24 0752    vancomycin (Vancocin) 1,000 mg in sodium chloride 0.9% 250 mL IVPB-ADDV  1,000 mg Intravenous Once Fazuia Joaquin  mL/hr at 07/24/24 0752 1,000 mg at 07/24/24 0752    gentamicin (Garamycin) 300 mg in sodium chloride 0.9% 100 mL IVPB  300 mg Intravenous Once Fauzia Joaquin DO         Current Outpatient Medications Ordered in Epic   Medication Sig Dispense Refill    sulfamethoxazole-trimethoprim -160 MG Oral Tab per tablet Take 1 tablet by mouth 2 (two) times daily for 3 days. 6 tablet 0       Allergies   Allergen Reactions    Carbamazepine ITCHING    Iron ANAPHYLAXIS     IV    Pneumovax [Pneumococcal Polysaccharides] SWELLING     Under right eye, limited    Tramadol SHORTNESS OF BREATH    Ultram [Tramadol Hcl] ASTHMA    Lactated Ringers RASH    Tegretol RASH       Family History   Problem Relation Age of Onset    Gastro-Intestinal Disorder Father         Constipation    Neurological Disorder Father         Parkinson's    Hypertension Father     Lipids Father         hyperlipidemia    Other (Alzheimer's Disease) Father     Dementia Father     Other Father         alzheimers    Neurological Disorder Mother         ALS    Other (Janet Gehrig Disease) Mother     Other Mother     Gastro-Intestinal Disorder Son         Chronic diarrhea    Other (Sigmoid volvulus) Sister     Other (Migraine HA) Son     Arthritis Maternal  Grandfather     Diabetes Other         pat uncle    Heart Disease Other         grandparent    Breast Cancer Paternal Grandmother         age unknown    Cancer Paternal Grandmother      Social History     Socioeconomic History    Marital status:    Occupational History    Occupation: Disabled    Occupation: Nutritionist   Tobacco Use    Smoking status: Former     Current packs/day: 0.00     Types: Cigarettes     Start date: 3/11/2015     Quit date: 3/11/2020     Years since quittin.3    Smokeless tobacco: Never    Tobacco comments:     last smoked 2020   Vaping Use    Vaping status: Never Used   Substance and Sexual Activity    Alcohol use: Not Currently     Alcohol/week: 0.0 standard drinks of alcohol    Drug use: No    Sexual activity: Not Currently   Other Topics Concern    Caffeine Concern Yes     Comment: 1-2 times daily (tea & pop)    Stress Concern Yes     Comment: much worry    Weight Concern No    Special Diet Yes     Comment: lactovegetarian    Exercise Yes    Seat Belt Yes       Available pre-op labs reviewed.             Vital Signs:  Body mass index is 18.17 kg/m².   height is 1.829 m (6') and weight is 60.8 kg (134 lb). Her oral temperature is 97.6 °F (36.4 °C). Her blood pressure is 94/63 and her pulse is 59. Her respiration is 16 and oxygen saturation is 93%.   Vitals:    24 0808 24 0726   BP:  94/63   Pulse:  59   Resp:  16   Temp:  97.6 °F (36.4 °C)   TempSrc:  Oral   SpO2:  93%   Weight: 59 kg (130 lb) 60.8 kg (134 lb)   Height: 1.829 m (6') 1.829 m (6')        Anesthesia Evaluation     Patient summary reviewed and Nursing notes reviewed    Airway   Mallampati: II  TM distance: >3 FB  Neck ROM: full  Dental - Dentition appears grossly intact     Pulmonary - normal exam   (+) sleep apnea  Cardiovascular - negative ROS and normal exam    ECG reviewed  ROS comment: H/o PE    Neuro/Psych    (+)  neuromuscular disease, headaches,        GI/Hepatic/Renal    (+) chronic renal  disease CRI    Endo/Other    (+) hypothyroidism  Abdominal  - normal exam                 Anesthesia Plan:   ASA:  3  Plan:   General and MAC  Post-op Pain Management: Oral pain medication and IV analgesics  Informed Consent Plan and Risks Discussed With:  Patient  Discussed plan with:  CRNA      I have informed Frieda Dumont and/or legal guardian or family member of the nature of the anesthetic plan, benefits, risks including possible dental damage if relevant, major complications, and any alternative forms of anesthetic management.   All of the patient's questions were answered to the best of my ability. The patient desires the anesthetic management as planned.  Jhony Lima CRNA  I have reviewed the above note agree with the plan.  Lungs: Clear   Heart: RHRR  Airway: Adequate   ASA III  NPO>Mn  Plan : GA.      Irasema Centeno MD.  7/24/2024 7:59 AM  Present on Admission:  **None**

## 2024-07-24 NOTE — ANESTHESIA POSTPROCEDURE EVALUATION
Patient: Frieda Dumont    Procedure Summary       Date: 07/24/24 Room / Location: [facility] MAIN OR 04 / [facility] MAIN OR    Anesthesia Start: 0815 Anesthesia Stop:     Procedure: Medtronic stage 2 right battery implantation (Right: Back) Diagnosis: (Urgency incontinence, urgency/frequency syndrome)    Surgeons: Fauzia Joaquin DO Anesthesiologist: Irasema Centeno MD    Anesthesia Type: general ASA Status: 3            Anesthesia Type: general    Vitals Value Taken Time   BP 87/60 07/24/24 0851   Temp 98 °F (36.7 °C) 07/24/24 0851   Pulse 65 07/24/24 0851   Resp 12 07/24/24 0851   SpO2 93 % 07/24/24 0851       [facility] AN Post Evaluation:   Patient Evaluated in PACU  Patient Participation: complete - patient participated  Level of Consciousness: sleepy but conscious  Pain Score: 0  Pain Management: adequate  Airway Patency:patent  Dental exam unchanged from preop  Yes    Cardiovascular Status: acceptable  Respiratory Status: acceptable  Postoperative Hydration acceptable      Jhony Lima CRNA  7/24/2024 8:51 AM

## 2024-07-24 NOTE — H&P
67yo F w feelings of incomplete emptying having to massage her bladder and do different maneuvers to void. STF 8-10, nocturia 3-4x.     Has tried flomax, cardura, PFPT and Myrbetriq              History/Other:              Current Outpatient Medications   Medication Sig Dispense Refill    baclofen 10 MG Oral Tab 3.5 tabs am, 3.5 tabs afternoon, 1 at hs 720 tablet 1    clonazePAM 2 MG Oral Tab TAKE 1 & 1/2 TABLETS (3 MG TOTAL) BY MOUTH IN THE MORNING, THEN TAKE 1 & 1/2 TABLETS (3 MG TOTAL) BY MOUTH IN THE AFTERNOON, THEN TAKE 1 TABLET (2 MG TOTAL) BY MOUTH AT NIGHT. 240 tablet 0    levothyroxine 75 MCG Oral Tab Take 1 tablet (75 mcg total) by mouth daily. 90 tablet 3    valACYclovir 1 G Oral Tab TAKE 2 TABLETS BY MOUTH AT ONSET. REPEAT 12 HOURS LATER. 8 tablet 1    alendronate 70 MG Oral Tab Take 1 tablet (70 mg total) by mouth once a week. 12 tablet 1    ketoconazole 2 % External Shampoo USE TO CLEANSE SCALP DAILY 360 mL 4    triamcinolone 0.1 % External Ointment APPLY TO AFFECTED AREA TWICE A DAY X1-2 WEEKS, THEN TAPER TO 1-2X WEEKLY AS NEEDED 80 g 2    doxazosin 4 MG Oral Tab Take 1 tablet (4 mg total) by mouth 2 (two) times a day. Monitor your BP carefully. Do not take it if your systolic (upper) blood pressure is less than 90. 180 tablet 3    GABAPENTIN 600 MG Oral Tab TAKE 2 TABLETS BY MOUTH THREE TIMES A DAY. 540 tablet 0    PARoxetine HCl 40 MG Oral Tab Take 0.5 tablets (20 mg total) by mouth 2 (two) times daily. 90 tablet 1    fluticasone propionate 50 MCG/ACT Nasal Suspension SPRAY 2 TIMES INTO NASAL ROUTE DAILY 48 g 2    famotidine 20 MG Oral Tab Take 20 mg by mouth 2 (two) times daily.        estradiol (ESTRACE) 0.1 MG/GM Vaginal Cream 1/2 index finger of cream per vagina 2 x a week 42.5 g 11    potassium chloride 20 MEQ Oral Tab CR Take 2 tablets (40 mEq total) by mouth daily. 180 tablet 3    nabumetone 500 MG Oral Tab Take 1 tablet (500 mg total) by mouth 2 (two) times daily. 180 tablet 3     Clobetasol Propionate 0.05 % External Solution APPLY TO SCALP TWICE A DAY X 1-2 WEEKS, THEN TAPER TO AS NEEDED FOR FLARES 50 mL 0    Cholecalciferol (VITAMIN D3) 75 MCG (3000 UT) Oral Tab Take 1 tablet by mouth daily.        Melatonin 1 MG Oral Cap Take 3 mg by mouth daily.        Saline 0.2 % Nasal Solution by Nasal route nightly.          DAILY MULTIVITAMIN OR 1 po qd                  Past Medical History:   Diagnosis Date    B12 deficiency      Bacterial overgrowth syndrome      Basal cell carcinoma of lip 05/11/2015    Bowel obstruction ([facility])      Bursitis       iliotibial     Calculus of kidney      Cardiomyopathy ([facility]) 2008     s/p cath (3/25/2008)-normal coronary arteries    Cecal volvulus ([facility])      Chronic constipation      Chronic interstitial cystitis      Chronic LBP      Closed fracture of one rib of left side with routine healing 04/26/2019    Dyssynergia       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    Environmental allergies      Fibromyalgia      Folate deficiency      History of Anorexia 2009    History of TMJ disorder      HPV in female 1990s, then 2014, 16     ASCUS 2014 w/HPV+, cert letter sent    Hypersomnia      Hypothyroidism      Insomnia      Internal hemorrhoids 2011    Keratoconjunctivitis sicca not specified as Sjogren's 11/11/2011    Migraines      Myofascial pain      SACHIN (obstructive sleep apnea) AHI 14 08/09/2011    Osteopenia 07/09/2015    Peripheral neuropathy      Pneumatosis cystoides intestinalis      Polypharmacy 5/22/2023    Pulmonary embolism ([facility]) 09/03/2021    Reflux      Rhabdomyolysis 03/2008    RLS (restless legs syndrome) 09/10/2008    Sciatica of left side 04/26/2019    Sepsis, unspecified 2008     pneumonia sepsis    Sicca ([facility]) 2011    Sleep apnea, obstructive PSG 11-5-10 Oral TX 6-6-12     AHI 14 Supine 36 Positional, CPAP 5 cm H2O/ Tap III at 2 mm    Thyroid nodule       \"s/p resection\"    Trigeminal neuralgia      Vitamin D deficiency 07/09/2015                  Past  Surgical History:   Procedure Laterality Date    APPENDECTOMY   ?    BENIGN BIOPSY LEFT       BENIGN BIOPSY RIGHT         unknown date    BREAST SURGERY PROCEDURE UNLISTED         2 breast bxs    CATH LEFT HEART CATHETERIZATION   3/25/2008     [facility] coronary angiography and L ventriculography via the R femoral artery    CHOLECYSTECTOMY   2013    COLONOSCOPY   05  [facility] ([facility])     Iron def anemia: internal hemorrhoids; random biopsies show melanosis coli    COLONOSCOPY   11  [facility]     Change in bowel habits, bloating: mod distal small bowel distention (random bx neg) but normal ileocolonic anastamosis, int hemorrhoids, no evidence of volvulus or obstruction; next routine colonoscopy in 10 years    COLONOSCOPY   2018    FLEX SIG   2018     MAC: band like structure on BLUE but no rectal masses on flex sig, mucosa normal, surgical anastomosis normal, int hemorrhoids    HERNIA SURGERY   2013     hernia repair    IMPLANT LEFT       IMPLANT RIGHT           1885,     OTHER   2014     colposcopy with Dr. Bromberger    OTHER SURGICAL HISTORY   3/16/09  [facility]     EGD (epig pain, early satiety): Normal    OTHER SURGICAL HISTORY   6-28-10     flow US ,dr carpio    OTHER SURGICAL HISTORY   7-8-10     Cysto- Dr. Carpio    OTHER SURGICAL HISTORY   8/3/05  [facility] ([facility])     EGD: Duodenitis, gastritis; duodenal biopsies show duodenitis but no obvious sprue    OTHER SURGICAL HISTORY         EXPLORATORY LAPAROTOMY, with bowel resection    OTHER SURGICAL HISTORY   2009     COLECTOMY, subtotal, for volvulus    OTHER SURGICAL HISTORY        ileus surgery    OTHER SURGICAL HISTORY   13     Flow US, Cysto - Dr. Carpio     OTHER SURGICAL HISTORY   2013     intestional surgery/hernia repear    OTHER SURGICAL HISTORY   3/2017     left ankle fx repair    OTHER SURGICAL HISTORY   17     Flow US  Dr. Mckeon    RECTAL MANOMETRY STUDY - EXTERNAL   2018     Type 2 dyssynergia, diminished  squeeze, delay in first sensation, urge and discomfort, RAIR absent, unable to expel balloon in three min, refer to biofeedback    SKIN SURGERY   2-25-15     BCC-nodular to left upper lip/MOHS by AB    SPECIAL SERVICE OR REPORT         Partial thyroidectomy    SPECIAL SERVICE OR REPORT         Breast augmentation    SPECIAL SERVICE OR REPORT         Sinus surgery    THYROID LOBECTOMY,UNILAT   5/19/1994     total R thyroid lobectomy, L subtotal thyroidectomy, autotransplantation of R inferior parathyroid gland, resection of Delphian lymph node superior to isthmus    UPPER GI ENDOSCOPY,BIOPSY   3/16/09     Performed by SRINIVASA WHITE at [facility] [place] [facility] [place], [facility]                    Family History   Problem Relation Age of Onset    Gastro-Intestinal Disorder Father           Constipation    Neurological Disorder Father           Parkinson's    Hypertension Father      Lipids Father           hyperlipidemia    Other (Alzheimer's Disease) Father      Dementia Father      Other Father           alzheimers    Neurological Disorder Mother           ALS    Other (Janet Gehrig Disease) Mother      Other Mother      Gastro-Intestinal Disorder Son           Chronic diarrhea    Other (Sigmoid volvulus) Sister      Other (Migraine HA) Son      Arthritis Maternal Grandfather      Diabetes Other           pat uncle    Heart Disease Other           grandparent    Breast Cancer Paternal Grandmother           age unknown    Cancer Paternal Grandmother           REVIEW OF SYSTEMS:     A 10-point comprehensive review of systems was negative with pertinent items noted in HPI.            Objective:  GENERAL: well developed, well nourished, in no apparent distress  HEENT: atraumatic, normocephalic  LUNGS: normal respiratory motion without distress  CARDIO:NA  Abd: Soft, non-tender, non-distended  : No SPT or CVAT                     Lab Results   Component Value Date     GLUCOSEDIP Negative 09/18/2023     BILIRUBIN Negative 09/18/2023      KETONESDIP Negative 09/18/2023     SPECGRAVITY 1.015 09/18/2023     BLOODU Negative 09/18/2023     PHURINE 6.0 09/18/2023     PROTEINDIP Negative 09/18/2023     UROBILIN 0.2 09/18/2023     NITRITE Negative 09/18/2023     LEUKOCYTES Trace (A) 09/18/2023            PVR: 5ml           Assessment & Plan:  69yo F  Diagnoses and all orders for this visit:     Incomplete emptying of bladder     Urgency-frequency syndrome     Nocturia      Plan:  Has tried flomax, cardura, PFPT and Myrbetriq. Not candidate for Ach given feeling of incomplete emptying is main symptoms  Will schedule interstim PNE  PVR low     Addendum:  Had PNE but <50% improvement so here today for stage I    Now after stage I has had 60% improvement so is here today for stage II        Fauzia Joaquin DO

## 2024-07-24 NOTE — OPERATIVE REPORT
[facility]  part of [facility]    Operative Note         Frieda Dumont Location: OR   [facility] [id number] MRN [id number]   Admission Date 7/24/2024 Operation Date 7/24/2024   Attending Physician Fauzia Joaquin DO       Patient Name: Frieda Dumont     Preoperative Diagnosis: Urgency incontinence, urgency/frequency syndrome     Postoperative Diagnosis: Urgency incontinence, urgency/frequency syndrome     Procedure(s):  Medtronic stage 2 right battery implantation     Primary Surgeon: Fauzia Joaquin DO           Anesthesia: MAC     Specimen: * No specimens in log *     Estimated Blood Loss: Blood Output: 10 mL (7/24/2024  8:37 AM)  Complications: none      Operative Summary:      PROCEDURE AND ANESTHESIA TYPE:   Implantation of InterStim II neurogenerator  electrical program analysis  initial programming less than 1 hour      OPERATIVE INDICATIONS:   This is a very pleasant patient with a history of symptomatic refractory and recurrent overactive bladder symptoms of urge frequency and urge incontinence. She has been tried with the InterStim I lead placement and has done extremely well with greater than 50% improvement in her symptoms including both urge frequency and incontinence symptoms. She has a voiding diary proof of this, which has now been scanned at the electronic medical record. The patient was apprised of treatment options and wished to undergo the InterStim II neurogenerator system implant. The procedure materials, personnel, indications, alternatives, risks, and benefits were described in detail. Consent was obtained. The patient now presents for definitive therapy.      PROCEDURE AND TECHNIQUE:   The patient was administered a MAC anesthetic and preoperative intravenous antibiotics, Vancomycin and Gentamicin. The patient was carefully placed in the prone position, and her gluteal area was prepped and draped in standard sterile fashion.  The operative field was prepped and draped.  We then injected a solution of lidocaine and marcained overlying the RIGHT incisional site, and this was then sequentially opened and then the percutaneous extension wire was disconnected and discarded. The incision was then deepened to allow implantation of the InterStim II neurogenerator. We then connected the InterStim II to the system after the pocket was irrigated and then proceeded to place the generator into the pocket subsequent to which time, electrical programming analysis was performed.    Electrical analysis demonstrated normal impedance values throughout all lead points and accordingly the device was programmed to the same settings as her external stimulator box for less than 1 hour. The incision was then closed with a 2-0 Vicryl followed by 4-0 Monocryl subcuticular closure and Dermabond.    Clean case.    No complications.      Implants:   Implant Name Type Inv. Item Serial No.  Lot No. LRB No. Used Action   SYSTEM NEUROSTIM [id number] 22GM N RECHRG INCL - [id number]  SYSTEM NEUROSTIM [id number] 22GM N RECHRG INCL [id number] [employer] WD N/A Right 1 Implanted        Drains: none     Condition: stable to PACU       Fauzia Joaquin DO

## 2024-08-31 ENCOUNTER — APPOINTMENT (OUTPATIENT)
Dept: GENERAL RADIOLOGY | Age: 69
DRG: 682 | End: 2024-08-31
Attending: EMERGENCY MEDICINE

## 2024-08-31 ENCOUNTER — HOSPITAL ENCOUNTER (OUTPATIENT)
Age: 69
Setting detail: OBSERVATION
Discharge: HOME HEALTH | DRG: 682 | End: 2024-08-31
Attending: EMERGENCY MEDICINE | Admitting: HOSPITALIST

## 2024-08-31 ENCOUNTER — APPOINTMENT (OUTPATIENT)
Dept: CT IMAGING | Age: 69
DRG: 682 | End: 2024-08-31
Attending: HOSPITALIST

## 2024-08-31 VITALS
SYSTOLIC BLOOD PRESSURE: 125 MMHG | WEIGHT: 140.43 LBS | OXYGEN SATURATION: 95 % | BODY MASS INDEX: 19.02 KG/M2 | TEMPERATURE: 97.7 F | HEIGHT: 72 IN | RESPIRATION RATE: 14 BRPM | HEART RATE: 64 BPM | DIASTOLIC BLOOD PRESSURE: 67 MMHG

## 2024-08-31 DIAGNOSIS — G89.29 OTHER CHRONIC PAIN: ICD-10-CM

## 2024-08-31 DIAGNOSIS — R53.1 GENERALIZED WEAKNESS: Primary | ICD-10-CM

## 2024-08-31 DIAGNOSIS — M79.7 FIBROMYALGIA: ICD-10-CM

## 2024-08-31 LAB
ALBUMIN SERPL-MCNC: 3.9 G/DL (ref 3.6–5.1)
ALBUMIN SERPL-MCNC: 3.9 G/DL (ref 3.6–5.1)
ALBUMIN/GLOB SERPL: 1 {RATIO} (ref 1–2.4)
ALBUMIN/GLOB SERPL: 1 {RATIO} (ref 1–2.4)
ALP SERPL-CCNC: 131 UNITS/L (ref 45–117)
ALP SERPL-CCNC: 133 UNITS/L (ref 45–117)
ALT SERPL-CCNC: 37 UNITS/L
ALT SERPL-CCNC: 40 UNITS/L
ANION GAP SERPL CALC-SCNC: 7 MMOL/L (ref 7–19)
ANION GAP SERPL CALC-SCNC: 9 MMOL/L (ref 7–19)
AST SERPL-CCNC: 39 UNITS/L
AST SERPL-CCNC: 79 UNITS/L
ATRIAL RATE (BPM): 70
BASOPHILS # BLD: 0 K/MCL (ref 0–0.3)
BASOPHILS NFR BLD: 0 %
BILIRUB SERPL-MCNC: 1.1 MG/DL (ref 0.2–1)
BILIRUB SERPL-MCNC: 1.3 MG/DL (ref 0.2–1)
BUN SERPL-MCNC: 29 MG/DL (ref 6–20)
BUN SERPL-MCNC: 35 MG/DL (ref 6–20)
BUN/CREAT SERPL: 18 (ref 7–25)
BUN/CREAT SERPL: 19 (ref 7–25)
CALCIUM SERPL-MCNC: 8.8 MG/DL (ref 8.4–10.2)
CALCIUM SERPL-MCNC: 9.3 MG/DL (ref 8.4–10.2)
CHLORIDE SERPL-SCNC: 103 MMOL/L (ref 97–110)
CHLORIDE SERPL-SCNC: 104 MMOL/L (ref 97–110)
CK SERPL-CCNC: 234 UNITS/L (ref 26–192)
CO2 SERPL-SCNC: 28 MMOL/L (ref 21–32)
CO2 SERPL-SCNC: 29 MMOL/L (ref 21–32)
CREAT SERPL-MCNC: 1.57 MG/DL (ref 0.51–0.95)
CREAT SERPL-MCNC: 1.81 MG/DL (ref 0.51–0.95)
DEPRECATED RDW RBC: 51.3 FL (ref 39–50)
EGFRCR SERPLBLD CKD-EPI 2021: 30 ML/MIN/{1.73_M2}
EGFRCR SERPLBLD CKD-EPI 2021: 36 ML/MIN/{1.73_M2}
EOSINOPHIL # BLD: 0 K/MCL (ref 0–0.5)
EOSINOPHIL NFR BLD: 0 %
ERYTHROCYTE [DISTWIDTH] IN BLOOD: 14.2 % (ref 11–15)
ETHANOL SERPL-MCNC: NORMAL MG/DL
FASTING DURATION TIME PATIENT: ABNORMAL H
FASTING DURATION TIME PATIENT: ABNORMAL H
GLOBULIN SER-MCNC: 3.8 G/DL (ref 2–4)
GLOBULIN SER-MCNC: 4.1 G/DL (ref 2–4)
GLUCOSE BLDC GLUCOMTR-MCNC: 100 MG/DL (ref 70–99)
GLUCOSE SERPL-MCNC: 103 MG/DL (ref 70–99)
GLUCOSE SERPL-MCNC: 99 MG/DL (ref 70–99)
HCT VFR BLD CALC: 38.8 % (ref 36–46.5)
HGB BLD-MCNC: 12.5 G/DL (ref 12–15.5)
IMM GRANULOCYTES # BLD AUTO: 0 K/MCL (ref 0–0.2)
IMM GRANULOCYTES # BLD: 0 %
LYMPHOCYTES # BLD: 0.6 K/MCL (ref 1–4)
LYMPHOCYTES NFR BLD: 8 %
MCH RBC QN AUTO: 31.7 PG (ref 26–34)
MCHC RBC AUTO-ENTMCNC: 32.2 G/DL (ref 32–36.5)
MCV RBC AUTO: 98.5 FL (ref 78–100)
MONOCYTES # BLD: 0.4 K/MCL (ref 0.3–0.9)
MONOCYTES NFR BLD: 6 %
NEUTROPHILS # BLD: 6.2 K/MCL (ref 1.8–7.7)
NEUTROPHILS NFR BLD: 86 %
NRBC BLD MANUAL-RTO: 0 /100 WBC
P AXIS (DEGREES): 60
PLATELET # BLD AUTO: 219 K/MCL (ref 140–450)
POTASSIUM SERPL-SCNC: 3.8 MMOL/L (ref 3.4–5.1)
POTASSIUM SERPL-SCNC: 5.5 MMOL/L (ref 3.4–5.1)
PR-INTERVAL (MSEC): 158
PROT SERPL-MCNC: 7.7 G/DL (ref 6.4–8.2)
PROT SERPL-MCNC: 8 G/DL (ref 6.4–8.2)
QRS-INTERVAL (MSEC): 88
QT-INTERVAL (MSEC): 430
QTC: 464
R AXIS (DEGREES): 64
RAINBOW EXTRA TUBES HOLD SPECIMEN: NORMAL
RBC # BLD: 3.94 MIL/MCL (ref 4–5.2)
REPORT TEXT: NORMAL
SODIUM SERPL-SCNC: 133 MMOL/L (ref 135–145)
SODIUM SERPL-SCNC: 137 MMOL/L (ref 135–145)
T AXIS (DEGREES): 56
TROPONIN I SERPL DL<=0.01 NG/ML-MCNC: 4 NG/L
TROPONIN I SERPL DL<=0.01 NG/ML-MCNC: 7 NG/L
TSH SERPL-ACNC: 0.75 MCUNITS/ML (ref 0.35–5)
VENTRICULAR RATE EKG/MIN (BPM): 70
WBC # BLD: 7.3 K/MCL (ref 4.2–11)

## 2024-08-31 PROCEDURE — 36415 COLL VENOUS BLD VENIPUNCTURE: CPT | Performed by: HOSPITALIST

## 2024-08-31 PROCEDURE — 84484 ASSAY OF TROPONIN QUANT: CPT | Performed by: HOSPITALIST

## 2024-08-31 PROCEDURE — 10002803 HB RX 637: Performed by: HOSPITALIST

## 2024-08-31 PROCEDURE — 84443 ASSAY THYROID STIM HORMONE: CPT | Performed by: HOSPITALIST

## 2024-08-31 PROCEDURE — 10002807 HB RX 258: Performed by: EMERGENCY MEDICINE

## 2024-08-31 PROCEDURE — 82077 ASSAY SPEC XCP UR&BREATH IA: CPT | Performed by: EMERGENCY MEDICINE

## 2024-08-31 PROCEDURE — 82550 ASSAY OF CK (CPK): CPT | Performed by: HOSPITALIST

## 2024-08-31 PROCEDURE — 74176 CT ABD & PELVIS W/O CONTRAST: CPT

## 2024-08-31 PROCEDURE — 84484 ASSAY OF TROPONIN QUANT: CPT | Performed by: EMERGENCY MEDICINE

## 2024-08-31 PROCEDURE — 85025 COMPLETE CBC W/AUTO DIFF WBC: CPT | Performed by: EMERGENCY MEDICINE

## 2024-08-31 PROCEDURE — 99285 EMERGENCY DEPT VISIT HI MDM: CPT

## 2024-08-31 PROCEDURE — 10002807 HB RX 258: Performed by: HOSPITALIST

## 2024-08-31 PROCEDURE — 70450 CT HEAD/BRAIN W/O DYE: CPT

## 2024-08-31 PROCEDURE — G0378 HOSPITAL OBSERVATION PER HR: HCPCS

## 2024-08-31 PROCEDURE — 82962 GLUCOSE BLOOD TEST: CPT

## 2024-08-31 PROCEDURE — 71045 X-RAY EXAM CHEST 1 VIEW: CPT

## 2024-08-31 PROCEDURE — 80053 COMPREHEN METABOLIC PANEL: CPT | Performed by: EMERGENCY MEDICINE

## 2024-08-31 PROCEDURE — 93005 ELECTROCARDIOGRAM TRACING: CPT | Performed by: EMERGENCY MEDICINE

## 2024-08-31 PROCEDURE — 93010 ELECTROCARDIOGRAM REPORT: CPT | Performed by: INTERNAL MEDICINE

## 2024-08-31 PROCEDURE — 10004651 HB RX, NO CHARGE ITEM: Performed by: HOSPITALIST

## 2024-08-31 PROCEDURE — 80053 COMPREHEN METABOLIC PANEL: CPT | Performed by: HOSPITALIST

## 2024-08-31 RX ORDER — CLONAZEPAM 2 MG/1
2 TABLET ORAL 4 TIMES DAILY
COMMUNITY
End: 2024-09-05 | Stop reason: SDUPTHER

## 2024-08-31 RX ORDER — PAROXETINE 40 MG/1
40 TABLET, FILM COATED ORAL EVERY MORNING
Status: DISCONTINUED | OUTPATIENT
Start: 2024-09-01 | End: 2024-08-31

## 2024-08-31 RX ORDER — POTASSIUM CHLORIDE 1.5 G/1.58G
20 POWDER, FOR SOLUTION ORAL 2 TIMES DAILY
COMMUNITY

## 2024-08-31 RX ORDER — HYDRALAZINE HYDROCHLORIDE 10 MG/1
10 TABLET, FILM COATED ORAL EVERY 6 HOURS PRN
Status: DISCONTINUED | OUTPATIENT
Start: 2024-08-31 | End: 2024-09-02 | Stop reason: HOSPADM

## 2024-08-31 RX ORDER — CLONAZEPAM 0.5 MG/1
1 TABLET ORAL 3 TIMES DAILY
Status: DISCONTINUED | OUTPATIENT
Start: 2024-08-31 | End: 2024-08-31

## 2024-08-31 RX ORDER — CLONAZEPAM 0.5 MG/1
0.5 TABLET ORAL 3 TIMES DAILY PRN
Status: DISCONTINUED | OUTPATIENT
Start: 2024-08-31 | End: 2024-09-02 | Stop reason: HOSPADM

## 2024-08-31 RX ORDER — POLYETHYLENE GLYCOL 3350 17 G/17G
17 POWDER, FOR SOLUTION ORAL DAILY PRN
Status: DISCONTINUED | OUTPATIENT
Start: 2024-08-31 | End: 2024-09-02 | Stop reason: HOSPADM

## 2024-08-31 RX ORDER — FLUTICASONE PROPIONATE 50 MCG
1 SPRAY, SUSPENSION (ML) NASAL DAILY
COMMUNITY

## 2024-08-31 RX ORDER — BACLOFEN 10 MG/1
35 TABLET ORAL 2 TIMES DAILY
COMMUNITY
Start: 2024-09-04

## 2024-08-31 RX ORDER — DOXAZOSIN 1 MG/1
4 TABLET ORAL 2 TIMES DAILY
Status: DISCONTINUED | OUTPATIENT
Start: 2024-08-31 | End: 2024-09-02 | Stop reason: HOSPADM

## 2024-08-31 RX ORDER — BACLOFEN 10 MG/1
5 TABLET ORAL 3 TIMES DAILY
Status: DISCONTINUED | OUTPATIENT
Start: 2024-08-31 | End: 2024-08-31

## 2024-08-31 RX ORDER — CLONAZEPAM 1 MG/1
2 TABLET ORAL 3 TIMES DAILY
Status: DISCONTINUED | OUTPATIENT
Start: 2024-09-01 | End: 2024-09-01

## 2024-08-31 RX ORDER — GABAPENTIN 100 MG/1
200 CAPSULE ORAL 3 TIMES DAILY
Status: DISCONTINUED | OUTPATIENT
Start: 2024-08-31 | End: 2024-08-31

## 2024-08-31 RX ORDER — SODIUM CHLORIDE 9 MG/ML
INJECTION, SOLUTION INTRAVENOUS CONTINUOUS
Status: DISCONTINUED | OUTPATIENT
Start: 2024-08-31 | End: 2024-09-02

## 2024-08-31 RX ORDER — HEPARIN SODIUM 5000 [USP'U]/ML
5000 INJECTION, SOLUTION INTRAVENOUS; SUBCUTANEOUS EVERY 8 HOURS SCHEDULED
Status: DISCONTINUED | OUTPATIENT
Start: 2024-08-31 | End: 2024-09-02 | Stop reason: HOSPADM

## 2024-08-31 RX ORDER — LANOLIN ALCOHOL/MO/W.PET/CERES
3 CREAM (GRAM) TOPICAL NIGHTLY PRN
Status: DISCONTINUED | OUTPATIENT
Start: 2024-08-31 | End: 2024-09-02 | Stop reason: HOSPADM

## 2024-08-31 RX ORDER — LEVOTHYROXINE SODIUM 75 UG/1
75 TABLET ORAL DAILY
COMMUNITY

## 2024-08-31 RX ORDER — ACETAMINOPHEN 325 MG/1
650 TABLET ORAL EVERY 4 HOURS PRN
Status: DISCONTINUED | OUTPATIENT
Start: 2024-08-31 | End: 2024-09-02 | Stop reason: HOSPADM

## 2024-08-31 RX ORDER — BACLOFEN 10 MG/1
10 TABLET ORAL 3 TIMES DAILY
Status: DISCONTINUED | OUTPATIENT
Start: 2024-09-01 | End: 2024-09-02 | Stop reason: HOSPADM

## 2024-08-31 RX ORDER — PAROXETINE 10 MG/1
20 TABLET, FILM COATED ORAL 2 TIMES DAILY
Status: DISCONTINUED | OUTPATIENT
Start: 2024-08-31 | End: 2024-09-02 | Stop reason: HOSPADM

## 2024-08-31 RX ORDER — ONDANSETRON 2 MG/ML
4 INJECTION INTRAMUSCULAR; INTRAVENOUS EVERY 12 HOURS PRN
Status: DISCONTINUED | OUTPATIENT
Start: 2024-08-31 | End: 2024-09-02 | Stop reason: HOSPADM

## 2024-08-31 RX ORDER — PAROXETINE 40 MG/1
40 TABLET, FILM COATED ORAL EVERY MORNING
COMMUNITY

## 2024-08-31 RX ORDER — LIDOCAINE 50 MG/G
1 PATCH TOPICAL PRN
COMMUNITY

## 2024-08-31 RX ORDER — ONDANSETRON 4 MG/1
4 TABLET, ORALLY DISINTEGRATING ORAL EVERY 12 HOURS PRN
Status: DISCONTINUED | OUTPATIENT
Start: 2024-08-31 | End: 2024-09-02 | Stop reason: HOSPADM

## 2024-08-31 RX ORDER — NAPROXEN 500 MG/1
500 TABLET ORAL 2 TIMES DAILY WITH MEALS
Status: ON HOLD | COMMUNITY
End: 2024-09-02 | Stop reason: HOSPADM

## 2024-08-31 RX ORDER — 0.9 % SODIUM CHLORIDE 0.9 %
2 VIAL (ML) INJECTION EVERY 12 HOURS SCHEDULED
Status: DISCONTINUED | OUTPATIENT
Start: 2024-08-31 | End: 2024-09-02 | Stop reason: HOSPADM

## 2024-08-31 RX ORDER — LEVOTHYROXINE SODIUM 75 UG/1
75 TABLET ORAL DAILY
Status: DISCONTINUED | OUTPATIENT
Start: 2024-09-01 | End: 2024-09-02 | Stop reason: HOSPADM

## 2024-08-31 RX ORDER — DOXAZOSIN 4 MG/1
4 TABLET ORAL 2 TIMES DAILY
COMMUNITY
End: 2024-09-05

## 2024-08-31 RX ORDER — ACETAMINOPHEN 650 MG/1
650 SUPPOSITORY RECTAL EVERY 4 HOURS PRN
Status: DISCONTINUED | OUTPATIENT
Start: 2024-08-31 | End: 2024-09-02 | Stop reason: HOSPADM

## 2024-08-31 RX ADMIN — GABAPENTIN 400 MG: 100 CAPSULE ORAL at 21:37

## 2024-08-31 RX ADMIN — SODIUM CHLORIDE: 9 INJECTION, SOLUTION INTRAVENOUS at 22:51

## 2024-08-31 RX ADMIN — SODIUM CHLORIDE 1000 ML: 0.9 INJECTION, SOLUTION INTRAVENOUS at 18:29

## 2024-08-31 RX ADMIN — SODIUM CHLORIDE, PRESERVATIVE FREE 2 ML: 5 INJECTION INTRAVENOUS at 22:42

## 2024-08-31 RX ADMIN — SODIUM ZIRCONIUM CYCLOSILICATE 10 G: 10 POWDER, FOR SUSPENSION ORAL at 18:29

## 2024-08-31 RX ADMIN — CLONAZEPAM 1 MG: 0.5 TABLET ORAL at 21:41

## 2024-08-31 RX ADMIN — PAROXETINE HYDROCHLORIDE HEMIHYDRATE 20 MG: 10 TABLET, FILM COATED ORAL at 23:14

## 2024-08-31 RX ADMIN — BACLOFEN 5 MG: 10 TABLET ORAL at 21:41

## 2024-08-31 SDOH — ECONOMIC STABILITY: HOUSING INSECURITY: WHAT IS YOUR LIVING SITUATION TODAY?: I HAVE A STEADY PLACE TO LIVE

## 2024-08-31 ASSESSMENT — COGNITIVE AND FUNCTIONAL STATUS - GENERAL: DO YOU HAVE DIFFICULTY DRESSING OR BATHING: NO

## 2024-08-31 ASSESSMENT — PAIN SCALES - GENERAL: PAINLEVEL_OUTOF10: 0

## 2024-09-01 ENCOUNTER — APPOINTMENT (OUTPATIENT)
Dept: ULTRASOUND IMAGING | Age: 69
DRG: 682 | End: 2024-09-01
Attending: HOSPITALIST

## 2024-09-01 LAB
ALBUMIN SERPL-MCNC: 3.6 G/DL (ref 3.6–5.1)
ALBUMIN/GLOB SERPL: 1 {RATIO} (ref 1–2.4)
ALP SERPL-CCNC: 118 UNITS/L (ref 45–117)
ALT SERPL-CCNC: 30 UNITS/L
ANION GAP SERPL CALC-SCNC: 11 MMOL/L (ref 7–19)
APPEARANCE UR: CLEAR
AST SERPL-CCNC: 35 UNITS/L
BACTERIA #/AREA URNS HPF: ABNORMAL /HPF
BASOPHILS # BLD: 0 K/MCL (ref 0–0.3)
BASOPHILS NFR BLD: 0 %
BILIRUB SERPL-MCNC: 1.2 MG/DL (ref 0.2–1)
BILIRUB UR QL STRIP: NEGATIVE
BUN SERPL-MCNC: 24 MG/DL (ref 6–20)
BUN/CREAT SERPL: 19 (ref 7–25)
CALCIUM SERPL-MCNC: 8.8 MG/DL (ref 8.4–10.2)
CHLORIDE SERPL-SCNC: 108 MMOL/L (ref 97–110)
CK SERPL-CCNC: 202 UNITS/L (ref 26–192)
CO2 SERPL-SCNC: 24 MMOL/L (ref 21–32)
COLOR UR: COLORLESS
CREAT SERPL-MCNC: 1.25 MG/DL (ref 0.51–0.95)
CREAT UR-MCNC: 41.1 MG/DL
DEPRECATED RDW RBC: 51.5 FL (ref 39–50)
EGFRCR SERPLBLD CKD-EPI 2021: 47 ML/MIN/{1.73_M2}
EOSINOPHIL # BLD: 0.1 K/MCL (ref 0–0.5)
EOSINOPHIL NFR BLD: 1 %
ERYTHROCYTE [DISTWIDTH] IN BLOOD: 14.2 % (ref 11–15)
FASTING DURATION TIME PATIENT: ABNORMAL H
GLOBULIN SER-MCNC: 3.7 G/DL (ref 2–4)
GLUCOSE SERPL-MCNC: 98 MG/DL (ref 70–99)
GLUCOSE UR STRIP-MCNC: NEGATIVE MG/DL
HCT VFR BLD CALC: 38.9 % (ref 36–46.5)
HGB BLD-MCNC: 12.3 G/DL (ref 12–15.5)
HGB UR QL STRIP: ABNORMAL
HYALINE CASTS #/AREA URNS LPF: ABNORMAL /LPF
IMM GRANULOCYTES # BLD AUTO: 0 K/MCL (ref 0–0.2)
IMM GRANULOCYTES # BLD: 0 %
KETONES UR STRIP-MCNC: ABNORMAL MG/DL
LEUKOCYTE ESTERASE UR QL STRIP: ABNORMAL
LYMPHOCYTES # BLD: 1 K/MCL (ref 1–4)
LYMPHOCYTES NFR BLD: 15 %
MAGNESIUM SERPL-MCNC: 2.6 MG/DL (ref 1.7–2.4)
MCH RBC QN AUTO: 31.1 PG (ref 26–34)
MCHC RBC AUTO-ENTMCNC: 31.6 G/DL (ref 32–36.5)
MCV RBC AUTO: 98.2 FL (ref 78–100)
MONOCYTES # BLD: 0.5 K/MCL (ref 0.3–0.9)
MONOCYTES NFR BLD: 7 %
NEUTROPHILS # BLD: 5.3 K/MCL (ref 1.8–7.7)
NEUTROPHILS NFR BLD: 77 %
NITRITE UR QL STRIP: NEGATIVE
NRBC BLD MANUAL-RTO: 0 /100 WBC
PH UR STRIP: 6.5 [PH] (ref 5–7)
PLATELET # BLD AUTO: 242 K/MCL (ref 140–450)
POTASSIUM SERPL-SCNC: 3.2 MMOL/L (ref 3.4–5.1)
PROT SERPL-MCNC: 7.3 G/DL (ref 6.4–8.2)
PROT UR STRIP-MCNC: NEGATIVE MG/DL
RBC # BLD: 3.96 MIL/MCL (ref 4–5.2)
RBC #/AREA URNS HPF: ABNORMAL /HPF
SODIUM SERPL-SCNC: 140 MMOL/L (ref 135–145)
SODIUM UR-SCNC: 24 MMOL/L
SP GR UR STRIP: 1.01 (ref 1–1.03)
SQUAMOUS #/AREA URNS HPF: ABNORMAL /HPF
TROPONIN I SERPL DL<=0.01 NG/ML-MCNC: 8 NG/L
UROBILINOGEN UR STRIP-MCNC: 0.2 MG/DL
WBC # BLD: 7 K/MCL (ref 4.2–11)
WBC #/AREA URNS HPF: ABNORMAL /HPF

## 2024-09-01 PROCEDURE — 84484 ASSAY OF TROPONIN QUANT: CPT | Performed by: HOSPITALIST

## 2024-09-01 PROCEDURE — 36415 COLL VENOUS BLD VENIPUNCTURE: CPT | Performed by: HOSPITALIST

## 2024-09-01 PROCEDURE — 10004651 HB RX, NO CHARGE ITEM: Performed by: HOSPITALIST

## 2024-09-01 PROCEDURE — 82550 ASSAY OF CK (CPK): CPT | Performed by: HOSPITALIST

## 2024-09-01 PROCEDURE — 87086 URINE CULTURE/COLONY COUNT: CPT | Performed by: EMERGENCY MEDICINE

## 2024-09-01 PROCEDURE — 76705 ECHO EXAM OF ABDOMEN: CPT

## 2024-09-01 PROCEDURE — 96372 THER/PROPH/DIAG INJ SC/IM: CPT | Performed by: HOSPITALIST

## 2024-09-01 PROCEDURE — 81001 URINALYSIS AUTO W/SCOPE: CPT | Performed by: EMERGENCY MEDICINE

## 2024-09-01 PROCEDURE — 84300 ASSAY OF URINE SODIUM: CPT | Performed by: HOSPITALIST

## 2024-09-01 PROCEDURE — G0378 HOSPITAL OBSERVATION PER HR: HCPCS

## 2024-09-01 PROCEDURE — 10002803 HB RX 637: Performed by: HOSPITALIST

## 2024-09-01 PROCEDURE — 10006031 HB ROOM CHARGE TELEMETRY

## 2024-09-01 PROCEDURE — 85025 COMPLETE CBC W/AUTO DIFF WBC: CPT | Performed by: HOSPITALIST

## 2024-09-01 PROCEDURE — 10002807 HB RX 258: Performed by: HOSPITALIST

## 2024-09-01 PROCEDURE — 10002800 HB RX 250 W HCPCS: Performed by: HOSPITALIST

## 2024-09-01 PROCEDURE — 82570 ASSAY OF URINE CREATININE: CPT | Performed by: HOSPITALIST

## 2024-09-01 PROCEDURE — 83735 ASSAY OF MAGNESIUM: CPT | Performed by: HOSPITALIST

## 2024-09-01 PROCEDURE — 80053 COMPREHEN METABOLIC PANEL: CPT | Performed by: HOSPITALIST

## 2024-09-01 RX ORDER — POTASSIUM CHLORIDE 1500 MG/1
40 TABLET, EXTENDED RELEASE ORAL ONCE
Status: COMPLETED | OUTPATIENT
Start: 2024-09-01 | End: 2024-09-01

## 2024-09-01 RX ORDER — CLONAZEPAM 0.5 MG/1
1.5 TABLET ORAL 3 TIMES DAILY
Status: DISCONTINUED | OUTPATIENT
Start: 2024-09-01 | End: 2024-09-02 | Stop reason: HOSPADM

## 2024-09-01 RX ORDER — FERROUS SULFATE 325(65) MG
325 TABLET ORAL
Status: DISCONTINUED | OUTPATIENT
Start: 2024-09-01 | End: 2024-09-02 | Stop reason: HOSPADM

## 2024-09-01 RX ADMIN — GABAPENTIN 400 MG: 100 CAPSULE ORAL at 21:32

## 2024-09-01 RX ADMIN — HEPARIN SODIUM 5000 UNITS: 5000 INJECTION INTRAVENOUS; SUBCUTANEOUS at 14:01

## 2024-09-01 RX ADMIN — PAROXETINE HYDROCHLORIDE HEMIHYDRATE 20 MG: 10 TABLET, FILM COATED ORAL at 08:26

## 2024-09-01 RX ADMIN — BACLOFEN 10 MG: 10 TABLET ORAL at 08:26

## 2024-09-01 RX ADMIN — FERROUS SULFATE TAB 325 MG (65 MG ELEMENTAL FE) 325 MG: 325 (65 FE) TAB at 14:00

## 2024-09-01 RX ADMIN — CLONAZEPAM 1.5 MG: 0.5 TABLET ORAL at 21:32

## 2024-09-01 RX ADMIN — HEPARIN SODIUM 5000 UNITS: 5000 INJECTION INTRAVENOUS; SUBCUTANEOUS at 21:36

## 2024-09-01 RX ADMIN — GABAPENTIN 400 MG: 100 CAPSULE ORAL at 14:00

## 2024-09-01 RX ADMIN — SODIUM CHLORIDE 500 ML: 9 INJECTION, SOLUTION INTRAVENOUS at 08:13

## 2024-09-01 RX ADMIN — CLONAZEPAM 0.5 MG: 0.5 TABLET ORAL at 06:08

## 2024-09-01 RX ADMIN — SODIUM CHLORIDE: 9 INJECTION, SOLUTION INTRAVENOUS at 21:51

## 2024-09-01 RX ADMIN — CLONAZEPAM 2 MG: 1 TABLET ORAL at 08:27

## 2024-09-01 RX ADMIN — GABAPENTIN 400 MG: 100 CAPSULE ORAL at 08:27

## 2024-09-01 RX ADMIN — PAROXETINE HYDROCHLORIDE HEMIHYDRATE 20 MG: 10 TABLET, FILM COATED ORAL at 21:33

## 2024-09-01 RX ADMIN — SODIUM CHLORIDE, PRESERVATIVE FREE 2 ML: 5 INJECTION INTRAVENOUS at 08:27

## 2024-09-01 RX ADMIN — DOXAZOSIN 4 MG: 4 TABLET ORAL at 08:26

## 2024-09-01 RX ADMIN — SODIUM CHLORIDE: 9 INJECTION, SOLUTION INTRAVENOUS at 10:29

## 2024-09-01 RX ADMIN — Medication 3 MG: at 21:46

## 2024-09-01 RX ADMIN — BACLOFEN 10 MG: 10 TABLET ORAL at 14:00

## 2024-09-01 RX ADMIN — BACLOFEN 10 MG: 10 TABLET ORAL at 21:33

## 2024-09-01 RX ADMIN — POTASSIUM CHLORIDE 40 MEQ: 1500 TABLET, EXTENDED RELEASE ORAL at 10:30

## 2024-09-01 RX ADMIN — CLONAZEPAM 1.5 MG: 0.5 TABLET ORAL at 14:00

## 2024-09-01 RX ADMIN — LEVOTHYROXINE SODIUM 75 MCG: 75 TABLET ORAL at 06:08

## 2024-09-01 SDOH — ECONOMIC STABILITY: TRANSPORTATION INSECURITY
IN THE PAST 12 MONTHS, HAS LACK OF RELIABLE TRANSPORTATION KEPT YOU FROM MEDICAL APPOINTMENTS, MEETINGS, WORK OR FROM GETTING THINGS NEEDED FOR DAILY LIVING?: NO

## 2024-09-01 SDOH — ECONOMIC STABILITY: INCOME INSECURITY: IN THE PAST 12 MONTHS, HAS THE ELECTRIC, GAS, OIL, OR WATER COMPANY THREATENED TO SHUT OFF SERVICE IN YOUR HOME?: NO

## 2024-09-01 SDOH — SOCIAL STABILITY: SOCIAL INSECURITY: HOW OFTEN DOES ANYONE, INCLUDING FAMILY AND FRIENDS, PHYSICALLY HURT YOU?: NEVER

## 2024-09-01 SDOH — ECONOMIC STABILITY: GENERAL

## 2024-09-01 SDOH — HEALTH STABILITY: GENERAL: BECAUSE OF A PHYSICAL, MENTAL, OR EMOTIONAL CONDITION, DO YOU HAVE DIFFICULTY DOING ERRANDS ALONE?: NO

## 2024-09-01 SDOH — ECONOMIC STABILITY: HOUSING INSECURITY: DO YOU HAVE PROBLEMS WITH ANY OF THE FOLLOWING?: NONE OF THE ABOVE

## 2024-09-01 SDOH — SOCIAL STABILITY: SOCIAL INSECURITY: HOW OFTEN DOES ANYONE, INCLUDING FAMILY AND FRIENDS, THREATEN YOU WITH HARM?: NEVER

## 2024-09-01 SDOH — ECONOMIC STABILITY: FOOD INSECURITY: WITHIN THE PAST 12 MONTHS, THE FOOD YOU BOUGHT JUST DIDN'T LAST AND YOU DIDN'T HAVE MONEY TO GET MORE.: NEVER TRUE

## 2024-09-01 SDOH — SOCIAL STABILITY: SOCIAL INSECURITY: HOW OFTEN DOES ANYONE, INCLUDING FAMILY AND FRIENDS, INSULT OR TALK DOWN TO YOU?: NEVER

## 2024-09-01 SDOH — HEALTH STABILITY: PHYSICAL HEALTH: DO YOU HAVE DIFFICULTY DRESSING OR BATHING?: NO

## 2024-09-01 SDOH — ECONOMIC STABILITY: GENERAL: WOULD YOU LIKE HELP WITH ANY OF THE FOLLOWING NEEDS?: I DON'T WANT HELP WITH ANY OF THESE

## 2024-09-01 SDOH — SOCIAL STABILITY: SOCIAL INSECURITY: HOW OFTEN DOES ANYONE, INCLUDING FAMILY AND FRIENDS, SCREAM OR CURSE AT YOU?: NEVER

## 2024-09-01 SDOH — SOCIAL STABILITY: SOCIAL NETWORK: SUPPORT SYSTEMS: FAMILY MEMBERS

## 2024-09-01 SDOH — SOCIAL STABILITY: SOCIAL NETWORK
HOW OFTEN DO YOU SEE OR TALK TO PEOPLE THAT YOU CARE ABOUT AND FEEL CLOSE TO? (FOR EXAMPLE: TALKING TO FRIENDS ON THE PHONE, VISITING FRIENDS OR FAMILY, GOING TO CHURCH OR CLUB MEETINGS): 3 TO 5 TIMES A WEEK

## 2024-09-01 SDOH — HEALTH STABILITY: GENERAL
BECAUSE OF A PHYSICAL, MENTAL, OR EMOTIONAL CONDITION, DO YOU HAVE SERIOUS DIFFICULTY CONCENTRATING, REMEMBERING OR MAKING DECISIONS?: NO

## 2024-09-01 SDOH — HEALTH STABILITY: PHYSICAL HEALTH: DO YOU HAVE SERIOUS DIFFICULTY WALKING OR CLIMBING STAIRS?: NO

## 2024-09-01 SDOH — ECONOMIC STABILITY: HOUSING INSECURITY: WHAT IS YOUR LIVING SITUATION TODAY?: ALONE

## 2024-09-01 SDOH — ECONOMIC STABILITY: HOUSING INSECURITY: WHAT IS YOUR LIVING SITUATION TODAY?: I HAVE A STEADY PLACE TO LIVE

## 2024-09-01 SDOH — ECONOMIC STABILITY: HOUSING INSECURITY: WHAT IS YOUR LIVING SITUATION TODAY?: CONDO

## 2024-09-01 ASSESSMENT — ORIENTATION MEMORY CONCENTRATION TEST (OMCT)
SAY THE MONTHS IN REVERSE ORDER STARTING WITH LAST MONTH: CORRECT
OMCT INTERPRETATION: 0-6: NO SIGNIFICANT IMPAIRMENT
WHAT MONTH IS IT NOW: CORRECT
OMCT SCORE: 4
REPEAT THE NAME AND ADDRESS I ASKED YOU TO REMEMBER: 2 ERRORS
WHAT YEAR IS IT NOW (MUST BE EXACT): CORRECT
WHAT TIME IS IT (NO WATCH OR CLOCK): CORRECT
COUNT BACKWARDS FROM 20 TO 1: CORRECT

## 2024-09-01 ASSESSMENT — PAIN SCALES - GENERAL
PAINLEVEL_OUTOF10: 0
PAINLEVEL_OUTOF10: 0
PAINLEVEL_OUTOF10: 9

## 2024-09-01 ASSESSMENT — COLUMBIA-SUICIDE SEVERITY RATING SCALE - C-SSRS
2. HAVE YOU ACTUALLY HAD ANY THOUGHTS OF KILLING YOURSELF?: NO
1. WITHIN THE PAST MONTH, HAVE YOU WISHED YOU WERE DEAD OR WISHED YOU COULD GO TO SLEEP AND NOT WAKE UP?: NO
6. HAVE YOU EVER DONE ANYTHING, STARTED TO DO ANYTHING, OR PREPARED TO DO ANYTHING TO END YOUR LIFE?: NO
IS THE PATIENT ABLE TO COMPLETE C-SSRS: YES

## 2024-09-01 ASSESSMENT — ACTIVITIES OF DAILY LIVING (ADL)
ADL_SCORE: 12
ADL_SHORT_OF_BREATH: NO
RECENT_DECLINE_ADL: NO
ADL_BEFORE_ADMISSION: INDEPENDENT

## 2024-09-01 ASSESSMENT — LIFESTYLE VARIABLES
HOW OFTEN DO YOU HAVE 6 OR MORE DRINKS ON ONE OCCASION: NEVER
HOW OFTEN DO YOU HAVE A DRINK CONTAINING ALCOHOL: NEVER
HOW MANY STANDARD DRINKS CONTAINING ALCOHOL DO YOU HAVE ON A TYPICAL DAY: 0,1 OR 2
AUDIT-C TOTAL SCORE: 0
ALCOHOL_USE_STATUS: NO OR LOW RISK WITH VALIDATED TOOL

## 2024-09-01 ASSESSMENT — PATIENT HEALTH QUESTIONNAIRE - PHQ9
SUM OF ALL RESPONSES TO PHQ9 QUESTIONS 1 AND 2: 0
2. FEELING DOWN, DEPRESSED OR HOPELESS: NOT AT ALL
1. LITTLE INTEREST OR PLEASURE IN DOING THINGS: NOT AT ALL
IS PATIENT ABLE TO COMPLETE PHQ2 OR PHQ9: YES
SUM OF ALL RESPONSES TO PHQ9 QUESTIONS 1 AND 2: 0
CLINICAL INTERPRETATION OF PHQ2 SCORE: NO FURTHER SCREENING NEEDED

## 2024-09-02 VITALS
RESPIRATION RATE: 16 BRPM | DIASTOLIC BLOOD PRESSURE: 75 MMHG | WEIGHT: 140.43 LBS | HEIGHT: 72 IN | TEMPERATURE: 98.4 F | OXYGEN SATURATION: 95 % | SYSTOLIC BLOOD PRESSURE: 129 MMHG | HEART RATE: 75 BPM | BODY MASS INDEX: 19.02 KG/M2

## 2024-09-02 LAB
ALBUMIN SERPL-MCNC: 2.9 G/DL (ref 3.6–5.1)
ALBUMIN/GLOB SERPL: 0.9 {RATIO} (ref 1–2.4)
ALP SERPL-CCNC: 102 UNITS/L (ref 45–117)
ALT SERPL-CCNC: 23 UNITS/L
ANION GAP SERPL CALC-SCNC: 8 MMOL/L (ref 7–19)
AST SERPL-CCNC: 26 UNITS/L
BACTERIA UR CULT: NORMAL
BASOPHILS # BLD: 0 K/MCL (ref 0–0.3)
BASOPHILS NFR BLD: 0 %
BILIRUB SERPL-MCNC: 0.7 MG/DL (ref 0.2–1)
BUN SERPL-MCNC: 16 MG/DL (ref 6–20)
BUN/CREAT SERPL: 15 (ref 7–25)
CALCIUM SERPL-MCNC: 8.5 MG/DL (ref 8.4–10.2)
CHLORIDE SERPL-SCNC: 116 MMOL/L (ref 97–110)
CO2 SERPL-SCNC: 20 MMOL/L (ref 21–32)
CREAT SERPL-MCNC: 1.04 MG/DL (ref 0.51–0.95)
DEPRECATED RDW RBC: 51 FL (ref 39–50)
EGFRCR SERPLBLD CKD-EPI 2021: 59 ML/MIN/{1.73_M2}
EOSINOPHIL # BLD: 0.1 K/MCL (ref 0–0.5)
EOSINOPHIL NFR BLD: 2 %
ERYTHROCYTE [DISTWIDTH] IN BLOOD: 14.2 % (ref 11–15)
FASTING DURATION TIME PATIENT: ABNORMAL H
GLOBULIN SER-MCNC: 3.4 G/DL (ref 2–4)
GLUCOSE BLDC GLUCOMTR-MCNC: 101 MG/DL (ref 70–99)
GLUCOSE SERPL-MCNC: 95 MG/DL (ref 70–99)
HCT VFR BLD CALC: 34.8 % (ref 36–46.5)
HGB BLD-MCNC: 11.3 G/DL (ref 12–15.5)
IMM GRANULOCYTES # BLD AUTO: 0 K/MCL (ref 0–0.2)
IMM GRANULOCYTES # BLD: 0 %
LYMPHOCYTES # BLD: 1.2 K/MCL (ref 1–4)
LYMPHOCYTES NFR BLD: 19 %
MAGNESIUM SERPL-MCNC: 2 MG/DL (ref 1.7–2.4)
MCH RBC QN AUTO: 31.7 PG (ref 26–34)
MCHC RBC AUTO-ENTMCNC: 32.5 G/DL (ref 32–36.5)
MCV RBC AUTO: 97.5 FL (ref 78–100)
MONOCYTES # BLD: 0.4 K/MCL (ref 0.3–0.9)
MONOCYTES NFR BLD: 7 %
NEUTROPHILS # BLD: 4.4 K/MCL (ref 1.8–7.7)
NEUTROPHILS NFR BLD: 72 %
NRBC BLD MANUAL-RTO: 0 /100 WBC
PLATELET # BLD AUTO: 186 K/MCL (ref 140–450)
POTASSIUM SERPL-SCNC: 3.2 MMOL/L (ref 3.4–5.1)
POTASSIUM SERPL-SCNC: 3.6 MMOL/L (ref 3.4–5.1)
PROT SERPL-MCNC: 6.3 G/DL (ref 6.4–8.2)
RBC # BLD: 3.57 MIL/MCL (ref 4–5.2)
SODIUM SERPL-SCNC: 141 MMOL/L (ref 135–145)
WBC # BLD: 6.1 K/MCL (ref 4.2–11)

## 2024-09-02 PROCEDURE — 10002803 HB RX 637: Performed by: HOSPITALIST

## 2024-09-02 PROCEDURE — 10002807 HB RX 258: Performed by: HOSPITALIST

## 2024-09-02 PROCEDURE — 83735 ASSAY OF MAGNESIUM: CPT | Performed by: HOSPITALIST

## 2024-09-02 PROCEDURE — 97166 OT EVAL MOD COMPLEX 45 MIN: CPT

## 2024-09-02 PROCEDURE — 10002800 HB RX 250 W HCPCS: Performed by: HOSPITALIST

## 2024-09-02 PROCEDURE — 97162 PT EVAL MOD COMPLEX 30 MIN: CPT

## 2024-09-02 PROCEDURE — 97535 SELF CARE MNGMENT TRAINING: CPT

## 2024-09-02 PROCEDURE — 10004651 HB RX, NO CHARGE ITEM: Performed by: HOSPITALIST

## 2024-09-02 PROCEDURE — 84132 ASSAY OF SERUM POTASSIUM: CPT | Performed by: HOSPITALIST

## 2024-09-02 PROCEDURE — 80053 COMPREHEN METABOLIC PANEL: CPT | Performed by: HOSPITALIST

## 2024-09-02 PROCEDURE — 36415 COLL VENOUS BLD VENIPUNCTURE: CPT | Performed by: HOSPITALIST

## 2024-09-02 PROCEDURE — 96372 THER/PROPH/DIAG INJ SC/IM: CPT | Performed by: HOSPITALIST

## 2024-09-02 PROCEDURE — 97116 GAIT TRAINING THERAPY: CPT

## 2024-09-02 PROCEDURE — 97110 THERAPEUTIC EXERCISES: CPT

## 2024-09-02 PROCEDURE — 85025 COMPLETE CBC W/AUTO DIFF WBC: CPT | Performed by: HOSPITALIST

## 2024-09-02 RX ORDER — POTASSIUM CHLORIDE 1500 MG/1
40 TABLET, EXTENDED RELEASE ORAL ONCE
Status: COMPLETED | OUTPATIENT
Start: 2024-09-02 | End: 2024-09-02

## 2024-09-02 RX ADMIN — POTASSIUM CHLORIDE 40 MEQ: 1500 TABLET, EXTENDED RELEASE ORAL at 10:21

## 2024-09-02 RX ADMIN — ACETAMINOPHEN 650 MG: 325 TABLET ORAL at 00:19

## 2024-09-02 RX ADMIN — GABAPENTIN 400 MG: 100 CAPSULE ORAL at 10:19

## 2024-09-02 RX ADMIN — BACLOFEN 10 MG: 10 TABLET ORAL at 10:20

## 2024-09-02 RX ADMIN — CLONAZEPAM 1.5 MG: 0.5 TABLET ORAL at 15:36

## 2024-09-02 RX ADMIN — LEVOTHYROXINE SODIUM 75 MCG: 75 TABLET ORAL at 05:30

## 2024-09-02 RX ADMIN — CLONAZEPAM 1.5 MG: 0.5 TABLET ORAL at 10:19

## 2024-09-02 RX ADMIN — HEPARIN SODIUM 5000 UNITS: 5000 INJECTION INTRAVENOUS; SUBCUTANEOUS at 05:30

## 2024-09-02 RX ADMIN — GABAPENTIN 400 MG: 100 CAPSULE ORAL at 15:36

## 2024-09-02 RX ADMIN — PAROXETINE HYDROCHLORIDE HEMIHYDRATE 20 MG: 10 TABLET, FILM COATED ORAL at 10:28

## 2024-09-02 RX ADMIN — FERROUS SULFATE TAB 325 MG (65 MG ELEMENTAL FE) 325 MG: 325 (65 FE) TAB at 10:20

## 2024-09-02 RX ADMIN — SODIUM CHLORIDE: 9 INJECTION, SOLUTION INTRAVENOUS at 07:52

## 2024-09-02 RX ADMIN — SODIUM CHLORIDE, PRESERVATIVE FREE 2 ML: 5 INJECTION INTRAVENOUS at 09:00

## 2024-09-02 RX ADMIN — BACLOFEN 10 MG: 10 TABLET ORAL at 15:37

## 2024-09-02 ASSESSMENT — COGNITIVE AND FUNCTIONAL STATUS - GENERAL
DAILY_ACTIVITY_RAW_SCORE: 23
BASIC_MOBILITY_RAW_SCORE: 17
BASIC_MOBILITY_CONVERTED_SCORE: 39.67
DAILY_ACTIVITY_CONVERTED_SCORE: 51.12
HELP NEEDED FOR BATHING: A LITTLE

## 2024-09-02 ASSESSMENT — ACTIVITIES OF DAILY LIVING (ADL)
PRIOR_ADL: INDEPENDENT
HOME_MANAGEMENT_TIME_ENTRY: 12
PRIOR_ADL_TOILETING: INDEPENDENT
PRIOR_ADL_BATHING: INDEPENDENT

## 2024-09-02 ASSESSMENT — PAIN SCALES - GENERAL
PAINLEVEL_OUTOF10: 0
PAINLEVEL_OUTOF10: 8

## 2024-09-02 ASSESSMENT — ENCOUNTER SYMPTOMS: PAIN SEVERITY NOW: 6

## 2024-09-04 ENCOUNTER — TELEPHONE (OUTPATIENT)
Dept: CARE COORDINATION | Age: 69
End: 2024-09-04

## 2024-09-04 ENCOUNTER — CASE MANAGEMENT (OUTPATIENT)
Dept: CARE COORDINATION | Age: 69
End: 2024-09-04

## 2024-09-09 ENCOUNTER — CASE MANAGEMENT (OUTPATIENT)
Dept: CARE COORDINATION | Age: 69
End: 2024-09-09

## 2024-09-11 ENCOUNTER — TELEPHONE (OUTPATIENT)
Dept: CARE COORDINATION | Age: 69
End: 2024-09-11

## 2024-09-11 ENCOUNTER — CASE MANAGEMENT (OUTPATIENT)
Dept: CARE COORDINATION | Age: 69
End: 2024-09-11

## 2024-09-12 ENCOUNTER — TELEPHONE (OUTPATIENT)
Dept: CARE COORDINATION | Age: 69
End: 2024-09-12

## 2024-09-12 ENCOUNTER — CASE MANAGEMENT (OUTPATIENT)
Dept: CARE COORDINATION | Age: 69
End: 2024-09-12

## 2024-09-18 ENCOUNTER — TELEPHONE (OUTPATIENT)
Dept: CARE COORDINATION | Age: 69
End: 2024-09-18

## 2024-09-19 ENCOUNTER — TELEPHONE (OUTPATIENT)
Dept: CARE COORDINATION | Age: 69
End: 2024-09-19

## 2024-09-25 ENCOUNTER — TELEPHONE (OUTPATIENT)
Dept: CARE COORDINATION | Age: 69
End: 2024-09-25

## 2024-09-26 ENCOUNTER — TELEPHONE (OUTPATIENT)
Dept: CARE COORDINATION | Age: 69
End: 2024-09-26

## 2024-10-02 ENCOUNTER — TELEPHONE (OUTPATIENT)
Dept: CARE COORDINATION | Age: 69
End: 2024-10-02

## (undated) DIAGNOSIS — J69.0 ASPIRATION PNEUMONIA, UNSPECIFIED ASPIRATION PNEUMONIA TYPE, UNSPECIFIED LATERALITY, UNSPECIFIED PART OF LUNG (HCC): ICD-10-CM

## (undated) DIAGNOSIS — A41.9 SEPSIS, DUE TO UNSPECIFIED ORGANISM, UNSPECIFIED WHETHER ACUTE ORGAN DYSFUNCTION PRESENT (HCC): ICD-10-CM

## (undated) DIAGNOSIS — G50.0 TRIGEMINAL NEURALGIA: ICD-10-CM

## (undated) DIAGNOSIS — G47.33 OSA ON CPAP: Primary | ICD-10-CM

## (undated) DIAGNOSIS — Z86.711 HISTORY OF PULMONARY EMBOLISM: ICD-10-CM

## (undated) DIAGNOSIS — J18.9 PNEUMONIA DUE TO INFECTIOUS ORGANISM, UNSPECIFIED LATERALITY, UNSPECIFIED PART OF LUNG: ICD-10-CM

## (undated) DIAGNOSIS — K59.9 COLONIC DYSMOTILITY: ICD-10-CM

## (undated) DIAGNOSIS — K56.609 SBO (SMALL BOWEL OBSTRUCTION) (HCC): ICD-10-CM

## (undated) DIAGNOSIS — N30.10 CHRONIC INTERSTITIAL CYSTITIS: ICD-10-CM

## (undated) DIAGNOSIS — R13.10 DYSPHAGIA, UNSPECIFIED TYPE: ICD-10-CM

## (undated) DIAGNOSIS — Z02.9 DISCHARGE PLANNING ISSUES: ICD-10-CM

## (undated) DIAGNOSIS — G89.29 CHRONIC FACIAL PAIN: ICD-10-CM

## (undated) DIAGNOSIS — Z99.89 OSA ON CPAP: Primary | ICD-10-CM

## (undated) DIAGNOSIS — R06.00 DYSPNEA ON EXERTION: ICD-10-CM

## (undated) DIAGNOSIS — K56.609 INTESTINAL OBSTRUCTION, UNSPECIFIED CAUSE, UNSPECIFIED WHETHER PARTIAL OR COMPLETE (HCC): ICD-10-CM

## (undated) DIAGNOSIS — R00.0 TACHYCARDIA: ICD-10-CM

## (undated) DIAGNOSIS — M79.7 FIBROMYALGIA: ICD-10-CM

## (undated) DIAGNOSIS — R53.1 GENERALIZED WEAKNESS: ICD-10-CM

## (undated) DIAGNOSIS — G47.33 OSA (OBSTRUCTIVE SLEEP APNEA): Primary | ICD-10-CM

## (undated) DIAGNOSIS — R51.9 CHRONIC FACIAL PAIN: ICD-10-CM

## (undated) DIAGNOSIS — N17.9 ACUTE KIDNEY INJURY (HCC): ICD-10-CM

## (undated) DIAGNOSIS — K56.609 SMALL BOWEL OBSTRUCTION (HCC): ICD-10-CM

## (undated) DEVICE — DRAPE,T,LAPARO,TRANS,STERILE: Brand: MEDLINE

## (undated) DEVICE — ADHESIVE LIQ 2/3ML VI MASTISOL

## (undated) DEVICE — SUT MCRYL 4-0 27IN ABSRB UD 19MM PS-2 3/8

## (undated) DEVICE — STIMULATOR NEUROMUSCULAR SM EXT TWST LOK SGL

## (undated) DEVICE — SUT COAT VCRL 2-0 27IN ABSRB UD 26MM CT-2

## (undated) DEVICE — SOLUTION IRRIG 1000ML ST H2O AQUALITE PLAS

## (undated) DEVICE — HANDSET NEUROSTIM COMMUNICATOR FOR INTERSTIM

## (undated) DEVICE — SUT MCRYL 4-0 18IN PS-2 ABSRB UD 19MM 3/8 CIR

## (undated) DEVICE — SHEET,DRAPE,53X77,STERILE: Brand: MEDLINE

## (undated) DEVICE — MINOR GENERAL: Brand: MEDLINE INDUSTRIES, INC.

## (undated) DEVICE — 3M™ TEGADERM™ TRANSPARENT FILM DRESSING FRAME STYLE, 1626W, 4 IN X 4-3/4 IN (10 CM X 12 CM), 50/CT 4CT/CASE: Brand: 3M™ TEGADERM™

## (undated) DEVICE — GLOVE SUR 7 SENSICARE PI PIP GRN PWD F

## (undated) DEVICE — GLOVE SUR 7 SENSICARE PI PIP CRM PWD F

## (undated) NOTE — MR AVS SNAPSHOT
After Visit Summary   10/5/2020    Jadon Tejeda    MRN: [id number]           Visit Information     Date & Time  10/5/2020  3:40 PM Provider  [facility]ikt[facility]ya Brunson [facility]  Phone PAROXETINE HCL 40 MG Oral Tab TAKE 0.5 TABLETS (20 MG TOTAL) BY MOUTH 2 (TWO) TIMES DAILY. KLOR-CON M20 20 MEQ Oral Tab CR TAKE 2 TABLETS (40 MEQ TOTAL) BY MOUTH DAILY. Nabumetone 500 MG Oral Tab Take 500 mg by mouth 2 (two) times daily.     baclofen [facility] now offers Video Visits through [address] for adult and pediatric patients. Video Visits are available Monday - Friday for many common conditions such as allergies, colds, cough, fever, rash, sore throat, headache and pink eye.   The cost for a Video [email or web address]   Monday – Friday  4:00 pm – 10:00 pm   Saturday – Sunday  10:00 am – 4:00 pm  WALK-IN CARE  Emergency Medicine Providers  Conditions needing urgent attention, but are   non-life-threatening.     Also available by appointment Average cost  $120*

## (undated) NOTE — LETTER
11/01/17        [address] [facility] [address]    5/9/1955      Dear Cristhian Farnsworth,    [address] [facility] records indicate that you have outstanding lab work and or testing that was ordered for you and has not yet been completed:          Glucose,

## (undated) NOTE — ED AVS SNAPSHOT
Adams Boss   MRN: [id number]    Department:  [facility] Emergency Department   Date of Visit:  3/3/2019           Disclosure     Insurance plans vary and the physician(s) referred by the ER may not be covered by your plan.  Please contact tell this physician (or your personal doctor if your instructions are to return to your personal doctor) about any new or lasting problems. The primary care or specialist physician will see patients referred from the [facility] Emergency Department.  Geovanny Richard

## (undated) NOTE — ED AVS SNAPSHOT
Irena Axel   MRN: [id number]    Department:  [facility] Emergency Department   Date of Visit:  2/24/2019           Disclosure     Insurance plans vary and the physician(s) referred by the ER may not be covered by your plan.  Please contact tell this physician (or your personal doctor if your instructions are to return to your personal doctor) about any new or lasting problems. The primary care or specialist physician will see patients referred from the [facility] Emergency Department.  Osei Barrientos

## (undated) NOTE — LETTER
BRISSA. Notifier: Mario[employer]   [facility]. Patient Name: Liza George. Identification Number: [id number]      Advance Beneficiary Notice of Noncoverage (ABN)  NOTE:  If Medicare doesn’t pay for D. BREAST & PELVIC EXAM  below, you may have to pay.   Me may ask to be paid now as I am responsible for payment. I cannot appeal if Medicare is not billed. ? OPTION 3. I don’t want the D. listed above.  I understand with this choice  I am not responsible for payment, and I cannot appeal to see if Medicare would

## (undated) NOTE — Clinical Note
Dr Jadon Calhoun am this pt's internist. Please see my assessment and plan regarding her polypharmacy and marked psychomotor retardation. I know you had approached the idea of weaning and I think it may be time to move forward.  Pt is barely functioning at this

## (undated) NOTE — MR AVS SNAPSHOT
[address]  [address] [address] [phone]               Thank you for choosing us for your health care visit with RICHARD Gupta.   We are glad to serve you and happy to provide you wi baclofen 10 MG Tabs   TAKE 2 & 1/2 TABLETS BY MOUTH 3 TIMES A DAY AND AN EXTRA 1/2 TABLET AT BEDTIME   Commonly known as:  LIORESAL           ClonazePAM 1 MG Tabs   TAKE 1 TABLET BY MOUTH THREE TIMES A DAY, 1 extra at hs for sleep   Commonly known as:  KL discharge instructions in [facility] [facility] by going to Visits < Admission Summaries. If you've been to the Emergency Department or your doctor's office, you can view your past visit information in [employer] by going to Visits < Visit Summaries. [employer] questions?